# Patient Record
Sex: MALE | Race: WHITE | NOT HISPANIC OR LATINO | Employment: OTHER | URBAN - METROPOLITAN AREA
[De-identification: names, ages, dates, MRNs, and addresses within clinical notes are randomized per-mention and may not be internally consistent; named-entity substitution may affect disease eponyms.]

---

## 2017-01-27 ENCOUNTER — OFFICE VISIT (OUTPATIENT)
Dept: CARDIOLOGY | Facility: CLINIC | Age: 67
End: 2017-01-27

## 2017-01-27 VITALS
BODY MASS INDEX: 19.8 KG/M2 | WEIGHT: 138 LBS | SYSTOLIC BLOOD PRESSURE: 100 MMHG | DIASTOLIC BLOOD PRESSURE: 63 MMHG | HEART RATE: 64 BPM

## 2017-01-27 DIAGNOSIS — I50.22 CHRONIC SYSTOLIC CONGESTIVE HEART FAILURE (HCC): ICD-10-CM

## 2017-01-27 DIAGNOSIS — J43.8 OTHER EMPHYSEMA (HCC): ICD-10-CM

## 2017-01-27 DIAGNOSIS — I25.5 CARDIOMYOPATHY, ISCHEMIC: ICD-10-CM

## 2017-01-27 DIAGNOSIS — I25.10 CHRONIC CORONARY ARTERY DISEASE: Primary | ICD-10-CM

## 2017-01-27 DIAGNOSIS — N18.30 CKD (CHRONIC KIDNEY DISEASE), STAGE III (HCC): ICD-10-CM

## 2017-01-27 PROCEDURE — 93000 ELECTROCARDIOGRAM COMPLETE: CPT | Performed by: INTERNAL MEDICINE

## 2017-01-27 PROCEDURE — 99214 OFFICE O/P EST MOD 30 MIN: CPT | Performed by: INTERNAL MEDICINE

## 2017-03-04 ENCOUNTER — TRANSCRIBE ORDERS (OUTPATIENT)
Dept: ADMINISTRATIVE | Facility: HOSPITAL | Age: 67
End: 2017-03-04

## 2017-03-04 ENCOUNTER — APPOINTMENT (OUTPATIENT)
Dept: LAB | Facility: HOSPITAL | Age: 67
End: 2017-03-04
Attending: INTERNAL MEDICINE

## 2017-03-04 DIAGNOSIS — N18.30 CHRONIC KIDNEY DISEASE, STAGE III (MODERATE) (HCC): Primary | ICD-10-CM

## 2017-03-04 LAB
ANION GAP SERPL CALCULATED.3IONS-SCNC: 19.7 MMOL/L
BASOPHILS # BLD AUTO: 0.01 10*3/MM3 (ref 0–0.2)
BASOPHILS NFR BLD AUTO: 0.1 % (ref 0–1.5)
BILIRUB UR QL STRIP: NEGATIVE
BUN BLD-MCNC: 20 MG/DL (ref 8–23)
BUN/CREAT SERPL: 11 (ref 7–25)
CALCIUM SPEC-SCNC: 9.6 MG/DL (ref 8.6–10.5)
CHLORIDE SERPL-SCNC: 97 MMOL/L (ref 98–107)
CLARITY UR: CLEAR
CO2 SERPL-SCNC: 19.3 MMOL/L (ref 22–29)
COLOR UR: YELLOW
CREAT BLD-MCNC: 1.81 MG/DL (ref 0.76–1.27)
DEPRECATED RDW RBC AUTO: 43.8 FL (ref 37–54)
EOSINOPHIL # BLD AUTO: 0.06 10*3/MM3 (ref 0–0.7)
EOSINOPHIL NFR BLD AUTO: 0.8 % (ref 0.3–6.2)
ERYTHROCYTE [DISTWIDTH] IN BLOOD BY AUTOMATED COUNT: 13.4 % (ref 11.5–14.5)
GFR SERPL CREATININE-BSD FRML MDRD: 38 ML/MIN/1.73
GLUCOSE BLD-MCNC: 100 MG/DL (ref 65–99)
GLUCOSE UR STRIP-MCNC: NEGATIVE MG/DL
HCT VFR BLD AUTO: 38.4 % (ref 40.4–52.2)
HGB BLD-MCNC: 12.9 G/DL (ref 13.7–17.6)
HGB UR QL STRIP.AUTO: NEGATIVE
IMM GRANULOCYTES # BLD: 0.03 10*3/MM3 (ref 0–0.03)
IMM GRANULOCYTES NFR BLD: 0.4 % (ref 0–0.5)
KETONES UR QL STRIP: NEGATIVE
LEUKOCYTE ESTERASE UR QL STRIP.AUTO: NEGATIVE
LYMPHOCYTES # BLD AUTO: 1.68 10*3/MM3 (ref 0.9–4.8)
LYMPHOCYTES NFR BLD AUTO: 23 % (ref 19.6–45.3)
MCH RBC QN AUTO: 30.5 PG (ref 27–32.7)
MCHC RBC AUTO-ENTMCNC: 33.6 G/DL (ref 32.6–36.4)
MCV RBC AUTO: 90.8 FL (ref 79.8–96.2)
MONOCYTES # BLD AUTO: 0.65 10*3/MM3 (ref 0.2–1.2)
MONOCYTES NFR BLD AUTO: 8.9 % (ref 5–12)
NEUTROPHILS # BLD AUTO: 4.87 10*3/MM3 (ref 1.9–8.1)
NEUTROPHILS NFR BLD AUTO: 66.8 % (ref 42.7–76)
NITRITE UR QL STRIP: NEGATIVE
PH UR STRIP.AUTO: <=5 [PH] (ref 5–8)
PLATELET # BLD AUTO: 242 10*3/MM3 (ref 140–500)
PMV BLD AUTO: 9.4 FL (ref 6–12)
POTASSIUM BLD-SCNC: 4.7 MMOL/L (ref 3.5–5.2)
PROT UR QL STRIP: NEGATIVE
RBC # BLD AUTO: 4.23 10*6/MM3 (ref 4.6–6)
SODIUM BLD-SCNC: 136 MMOL/L (ref 136–145)
SP GR UR STRIP: 1.01 (ref 1–1.03)
UROBILINOGEN UR QL STRIP: NORMAL
WBC NRBC COR # BLD: 7.3 10*3/MM3 (ref 4.5–10.7)

## 2017-03-04 PROCEDURE — 36415 COLL VENOUS BLD VENIPUNCTURE: CPT | Performed by: INTERNAL MEDICINE

## 2017-03-04 PROCEDURE — 81003 URINALYSIS AUTO W/O SCOPE: CPT | Performed by: INTERNAL MEDICINE

## 2017-03-04 PROCEDURE — 80048 BASIC METABOLIC PNL TOTAL CA: CPT | Performed by: INTERNAL MEDICINE

## 2017-03-04 PROCEDURE — 85025 COMPLETE CBC W/AUTO DIFF WBC: CPT | Performed by: INTERNAL MEDICINE

## 2017-04-28 ENCOUNTER — OFFICE VISIT (OUTPATIENT)
Dept: CARDIOLOGY | Facility: CLINIC | Age: 67
End: 2017-04-28

## 2017-04-28 VITALS
WEIGHT: 122 LBS | BODY MASS INDEX: 17.51 KG/M2 | HEART RATE: 90 BPM | DIASTOLIC BLOOD PRESSURE: 74 MMHG | SYSTOLIC BLOOD PRESSURE: 100 MMHG

## 2017-04-28 DIAGNOSIS — N18.30 CKD (CHRONIC KIDNEY DISEASE), STAGE III (HCC): ICD-10-CM

## 2017-04-28 DIAGNOSIS — I50.22 CHRONIC SYSTOLIC CONGESTIVE HEART FAILURE (HCC): ICD-10-CM

## 2017-04-28 DIAGNOSIS — I25.5 CARDIOMYOPATHY, ISCHEMIC: ICD-10-CM

## 2017-04-28 DIAGNOSIS — J43.8 OTHER EMPHYSEMA (HCC): ICD-10-CM

## 2017-04-28 DIAGNOSIS — I25.10 CHRONIC CORONARY ARTERY DISEASE: ICD-10-CM

## 2017-04-28 DIAGNOSIS — I10 ESSENTIAL HYPERTENSION: Primary | ICD-10-CM

## 2017-04-28 PROCEDURE — 93000 ELECTROCARDIOGRAM COMPLETE: CPT | Performed by: INTERNAL MEDICINE

## 2017-04-28 PROCEDURE — 99213 OFFICE O/P EST LOW 20 MIN: CPT | Performed by: INTERNAL MEDICINE

## 2017-09-05 ENCOUNTER — OFFICE (OUTPATIENT)
Dept: URBAN - METROPOLITAN AREA CLINIC 75 | Facility: CLINIC | Age: 67
End: 2017-09-05
Payer: COMMERCIAL

## 2017-09-05 DIAGNOSIS — K90.0 CELIAC DISEASE: ICD-10-CM

## 2017-09-05 DIAGNOSIS — R63.4 ABNORMAL WEIGHT LOSS: ICD-10-CM

## 2017-09-05 DIAGNOSIS — E03.9 HYPOTHYROIDISM, UNSPECIFIED: ICD-10-CM

## 2017-09-05 PROCEDURE — 99203 OFFICE O/P NEW LOW 30 MIN: CPT | Performed by: INTERNAL MEDICINE

## 2017-09-15 ENCOUNTER — TELEPHONE (OUTPATIENT)
Dept: CARDIOLOGY | Facility: CLINIC | Age: 67
End: 2017-09-15

## 2017-09-15 NOTE — TELEPHONE ENCOUNTER
----- Message from Tanesha Gallegos sent at 9/7/2017 11:52 AM EDT -----  PLEASE CALL LEONARDO AT DR [facility] OFFICE [phone] PRESS [phone]  CHECKING ON THE STATUS OF A FAX REQUEST FOR PT TO GO OFF MEDS FOR A GASTRO PROCEDURE.  PT WAS A DR LORENZO PT.

## 2017-09-15 NOTE — TELEPHONE ENCOUNTER
----- Message from Tanesha Gallegos sent at 9/7/2017 11:52 AM EDT -----  PLEASE CALL DILIP AT DR [facility] OFFICE [phone] PRESS [phone]  CHECKING ON THE STATUS OF A FAX REQUEST FOR PT TO GO OFF MEDS FOR A GASTRO PROCEDURE.  PT WAS A DR LORENZO PT.      Called l/m for Dilip to r/c     S/w dilip she states this is taken care of

## 2017-10-06 ENCOUNTER — LAB (OUTPATIENT)
Dept: LAB | Facility: HOSPITAL | Age: 67
End: 2017-10-06
Attending: INTERNAL MEDICINE

## 2017-10-06 ENCOUNTER — OFFICE VISIT (OUTPATIENT)
Dept: CARDIOLOGY | Facility: CLINIC | Age: 67
End: 2017-10-06

## 2017-10-06 ENCOUNTER — TRANSCRIBE ORDERS (OUTPATIENT)
Dept: ADMINISTRATIVE | Facility: HOSPITAL | Age: 67
End: 2017-10-06

## 2017-10-06 VITALS
HEART RATE: 61 BPM | SYSTOLIC BLOOD PRESSURE: 102 MMHG | BODY MASS INDEX: 17.32 KG/M2 | DIASTOLIC BLOOD PRESSURE: 60 MMHG | WEIGHT: 121 LBS | HEIGHT: 70 IN

## 2017-10-06 DIAGNOSIS — N18.30 CHRONIC KIDNEY DISEASE, STAGE III (MODERATE) (HCC): ICD-10-CM

## 2017-10-06 DIAGNOSIS — I10 ESSENTIAL HYPERTENSION: ICD-10-CM

## 2017-10-06 DIAGNOSIS — I25.5 CARDIOMYOPATHY, ISCHEMIC: Primary | ICD-10-CM

## 2017-10-06 DIAGNOSIS — N18.30 CKD (CHRONIC KIDNEY DISEASE), STAGE III (HCC): ICD-10-CM

## 2017-10-06 DIAGNOSIS — N18.30 CHRONIC KIDNEY DISEASE, STAGE III (MODERATE) (HCC): Primary | ICD-10-CM

## 2017-10-06 DIAGNOSIS — I25.10 CHRONIC CORONARY ARTERY DISEASE: ICD-10-CM

## 2017-10-06 LAB
ANION GAP SERPL CALCULATED.3IONS-SCNC: 14.5 MMOL/L
BACTERIA UR QL AUTO: NORMAL /HPF
BILIRUB UR QL STRIP: NEGATIVE
BUN BLD-MCNC: 20 MG/DL (ref 8–23)
BUN/CREAT SERPL: 12.4 (ref 7–25)
CALCIUM SPEC-SCNC: 9.4 MG/DL (ref 8.6–10.5)
CHLORIDE SERPL-SCNC: 100 MMOL/L (ref 98–107)
CLARITY UR: CLEAR
CO2 SERPL-SCNC: 23.5 MMOL/L (ref 22–29)
COLOR UR: YELLOW
CREAT BLD-MCNC: 1.61 MG/DL (ref 0.76–1.27)
CREAT UR-MCNC: 84.2 MG/DL
DEPRECATED RDW RBC AUTO: 49.2 FL (ref 37–54)
ERYTHROCYTE [DISTWIDTH] IN BLOOD BY AUTOMATED COUNT: 13.8 % (ref 11.5–14.5)
GFR SERPL CREATININE-BSD FRML MDRD: 43 ML/MIN/1.73
GLUCOSE BLD-MCNC: 97 MG/DL (ref 65–99)
GLUCOSE UR STRIP-MCNC: NEGATIVE MG/DL
HCT VFR BLD AUTO: 41.6 % (ref 40.4–52.2)
HGB BLD-MCNC: 14 G/DL (ref 13.7–17.6)
HGB UR QL STRIP.AUTO: NEGATIVE
HYALINE CASTS UR QL AUTO: NORMAL /LPF
KETONES UR QL STRIP: NEGATIVE
LEUKOCYTE ESTERASE UR QL STRIP.AUTO: NEGATIVE
MCH RBC QN AUTO: 32.4 PG (ref 27–32.7)
MCHC RBC AUTO-ENTMCNC: 33.7 G/DL (ref 32.6–36.4)
MCV RBC AUTO: 96.3 FL (ref 79.8–96.2)
NITRITE UR QL STRIP: NEGATIVE
PH UR STRIP.AUTO: 5.5 [PH] (ref 5–8)
PLATELET # BLD AUTO: 268 10*3/MM3 (ref 140–500)
PMV BLD AUTO: 9.9 FL (ref 6–12)
POTASSIUM BLD-SCNC: 4.4 MMOL/L (ref 3.5–5.2)
PROT UR QL STRIP: NEGATIVE
PROT UR-MCNC: 4 MG/DL
RBC # BLD AUTO: 4.32 10*6/MM3 (ref 4.6–6)
RBC # UR: NORMAL /HPF
REF LAB TEST METHOD: NORMAL
SODIUM BLD-SCNC: 138 MMOL/L (ref 136–145)
SP GR UR STRIP: 1.01 (ref 1–1.03)
SQUAMOUS #/AREA URNS HPF: NORMAL /HPF
UROBILINOGEN UR QL STRIP: NORMAL
WBC NRBC COR # BLD: 7.27 10*3/MM3 (ref 4.5–10.7)
WBC UR QL AUTO: NORMAL /HPF

## 2017-10-06 PROCEDURE — 82570 ASSAY OF URINE CREATININE: CPT

## 2017-10-06 PROCEDURE — 84156 ASSAY OF PROTEIN URINE: CPT

## 2017-10-06 PROCEDURE — 36415 COLL VENOUS BLD VENIPUNCTURE: CPT

## 2017-10-06 PROCEDURE — 85027 COMPLETE CBC AUTOMATED: CPT

## 2017-10-06 PROCEDURE — 93000 ELECTROCARDIOGRAM COMPLETE: CPT | Performed by: INTERNAL MEDICINE

## 2017-10-06 PROCEDURE — 80048 BASIC METABOLIC PNL TOTAL CA: CPT

## 2017-10-06 PROCEDURE — 81001 URINALYSIS AUTO W/SCOPE: CPT

## 2017-10-06 PROCEDURE — 99214 OFFICE O/P EST MOD 30 MIN: CPT | Performed by: INTERNAL MEDICINE

## 2017-10-06 NOTE — PROGRESS NOTES
Mayo ROXANNE Arias  1950  Date of Office Visit: 10/06/2017  Encounter Provider: Raad Alcantara MD  Place of Service: [facility] CARDIOLOG[facility]      CHIEF COMPLAINT:  Coronary artery disease, multi-vessel  Ischemic cardiomyopathy  Essential hypertension  Hyperlipidemia    HISTORY OF PRESENT ILLNESS:  Ms. Alvarenga,    I had the pleasure of seeing your patient as a transfer of care. As you well know, he is a pleasant 67-year-old gentleman with a medical history of coronary artery disease with LAD and diagonal critical stenosis,  of the circumflex, and  of the RCA, ischemic cardiomyopathy with an ejection fraction last documented at 35%, nonviable anterior wall, dyspnea on exertion, chronic kidney disease, hypertension and hyperlipidemia. He has previously been evaluated with a coronary angiography as documented below. At that time he was referred to bypass and was evaluated by 2 cardiac surgeons. He had a viability scan, which I have reviewed, showing a large fixed defect in the [id number] anterior wall and apex with just a tiny amount of reversibility in the inferoapical wall. The defect was fixed and not felt to be viable. This was a thallium scan. Secondary to that, surgery was not recommended. He has not had intervention secondary to the complexity of the lesion but also his lack of angina.     He continues to complain of dyspnea on exertion when he walks greater than 100 feet.  This is moderate in intensity and improves with rest.  He has no orthopnea or PND.  No lower extremity edema.  He denies angina.          Review of Systems   Constitution: Negative for fever, weakness and malaise/fatigue.   HENT: Negative for nosebleeds and sore throat.    Eyes: Negative for blurred vision and double vision.   Cardiovascular: Negative for chest pain, claudication, palpitations and syncope.   Respiratory: Negative for cough, shortness of breath and snoring.    Endocrine: Negative  "for cold intolerance, heat intolerance and polydipsia.   Skin: Negative for itching, poor wound healing and rash.   Musculoskeletal: Negative for joint pain, joint swelling, muscle weakness and myalgias.   Gastrointestinal: Negative for abdominal pain, melena, nausea and vomiting.   Neurological: Positive for light-headedness. Negative for loss of balance, seizures and vertigo.   Psychiatric/Behavioral: Negative for altered mental status and depression.         Past Medical History:   Diagnosis Date   • CHF (congestive heart failure)    • Coronary artery disease    • Stroke        The following portions of the patient's history were reviewed and updated as appropriate: Social history , Family history and Surgical history     Current Outpatient Prescriptions on File Prior to Visit   Medication Sig Dispense Refill   • amiodarone (PACERONE) 200 MG tablet Take  by mouth daily.     • aspirin 81 MG tablet Take  by mouth daily.     • BREO ELLIPTA 100-25 MCG/INH aerosol powder       • carvedilol (COREG) 12.5 MG tablet Take 1 tablet by mouth 2 (two) times a day.     • clopidogrel (PLAVIX) 75 MG tablet Take 1 tablet by mouth daily.     • fluticasone (FLONASE) 50 MCG/ACT nasal spray      • furosemide (LASIX) 20 MG tablet Take 10 mg by mouth Daily.     • gabapentin (NEURONTIN) 400 MG capsule Take 1 capsule by mouth 2 (two) times a day.     • ramipril (ALTACE) 2.5 MG capsule Take 1 capsule by mouth daily.     • RaNITidine HCl (ACID REDUCER PO) Take  by mouth.     • simvastatin (ZOCOR) 5 MG tablet      • spironolactone (ALDACTONE) 25 MG tablet 1/2 tab qd       No current facility-administered medications on file prior to visit.        No Known Allergies    Vitals:    10/06/17 1326   BP: 102/60   Pulse: 61   Weight: 121 lb (54.9 kg)   Height: 70\" (177.8 cm)     Physical Exam   Constitutional: He is oriented to person, place, and time.   Cachectic     HENT:   Head: Normocephalic and atraumatic.   Eyes: Conjunctivae and EOM are " normal. No scleral icterus.   Neck: Normal range of motion. Neck supple. Normal carotid pulses, no hepatojugular reflux and no JVD present. Carotid bruit is not present. No tracheal deviation present. No thyromegaly present.   Cardiovascular: Normal rate and regular rhythm.  Exam reveals no friction rub.    No murmur heard.  Pulses:       Carotid pulses are 2+ on the right side, and 2+ on the left side.       Radial pulses are 2+ on the right side, and 2+ on the left side.        Femoral pulses are 2+ on the right side, and 2+ on the left side.       Dorsalis pedis pulses are 2+ on the right side, and 2+ on the left side.        Posterior tibial pulses are 2+ on the right side, and 2+ on the left side.   Pulmonary/Chest: Breath sounds normal. No respiratory distress. He has no decreased breath sounds. He has no wheezes. He has no rhonchi. He has no rales. He exhibits no tenderness.   Abdominal: Soft. Bowel sounds are normal. He exhibits no distension. There is no tenderness. There is no rebound.   Musculoskeletal: He exhibits no edema or deformity.   Neurological: He is alert and oriented to person, place, and time. He has normal strength. No sensory deficit.   Skin: No rash noted. No erythema.   Psychiatric: He has a normal mood and affect. His behavior is normal.     No components found for: CBC  No results found for: CMP  No components found for: LIPID  No results found for: BMP      ECG 12 Lead  Date/Time: 10/6/2017 2:06 PM  Performed by: MIRIAN OSBORNE  Authorized by: MIRIAN OSBORNE   Comparison: compared with previous ECG from 4/28/2017  Similar to previous ECG  Rhythm: sinus rhythm  Rate: normal  Conduction: non-specific intraventricular conduction delay  QRS axis: normal  Comments: Non-specific ST-T wave abnormalities diffuse leads           7/1/16  TTE  · Mild to moderate aortic valve regurgitation is present.  · Right ventricular cavity is mildly dilated.  · Left ventricular wall thickness is  consistent with mild-to-moderate concentric hypertrophy.  · Left ventricular wall segments contract abnormally. Refer to wall scoring diagram for more information.  · Mild mitral valve regurgitation is present  · Left ventricular function is moderately decreased. Estimated EF = 35%.  · Left ventricular diastolic dysfunction (grade I a) consistent with impaired relaxation    2/2015     FINDINGS:  1. The arterial blood pressure is 122/76. The left ventricular pressure is 118  with a left ventricular end-diastolic pressure of approximately 18 mmHg. No  significant gradient across the aortic valve.   2. The LV systolic function was severely reduced, ejection fraction  approximately 20%. The posterior wall is akinetic. The nga-io-vxjboj anterior  wall and the apex are hypokinetic. The inferior wall is akinetic as well.   3. No significant mitral regurgitation.   4. The left main coronary artery is a large-caliber vessel with no significant  stenosis. It bifurcates into the LAD and circumflex coronary arteries in the  standard fashion. The mid-LAD at the bifurcation of the diagonal branch has  severe 95% bifurcation stenosis involving the LAD and also the origin of the  diagonal branch. After this, there is a stent in the mid-LAD. The stent has  restenosis as well.   5. The left circumflex coronary artery is totally occluded after the marginal  branch proximally. The marginal branch itself proximally has mild disease.   6. The right coronary artery is totally occluded at the midvessel.     DISCUSSION/SUMMARY 67-year-old gentleman with a medical history of severe coronary artery disease with chronic total occlusion of the right, critical disease in the LAD, ischemic cardiomyopathy with a severely depressed ejection fraction, mild mitral valve regurgitation, essential hypertension, chronic kidney disease, who also follows with Dr. Jorgito Schroeder secondary to COPD. He presents to me as a transfer of care. He currently appears  to be euvolemic and has no angina. He does have dyspnea on exertion and New York Heart Association Class III symptoms.     1. Coronary artery disease: I have reviewed his angiography, and although I think that his LAD and diagonal are fixable with PCI, I do not think this is going to benefit him much. He appears to be cachetic and more towards endstage in his overall condition. He has no angina at this time and this is a nonviable territory based on his imaging. Continue aspirin and Plavix. He can stop his Plavix for endoscopy if necessary. I would recommend restarting after his endoscopy if he is going to undergo that. Continue Lasix at current dose. Continue carvedilol and ramipril. We can consider in the future Entresto; however, I would not make that move at this point in time.   2. Essential hypertension: Reasonably controlled. Continue current therapy.   3. Hyperlipidemia: Continue simvastatin at the low dose that he is on.    I will plan on seeing him back in 6 mo    Coronary Artery Disease  Assessment  • The patient has no angina    Plan  • Lifestyle modifications discussed include adhering to a heart healthy diet and avoidance of tobacco products    Subjective - Objective  • There has been a previous stent procedure using RYAN  • Current antiplatelet therapy includes aspirin 81 mg and clopidogrel 75 mg    Heart Failure  Assessment  • NYHA class III-B - There is significant limitation of physical activity. The patient is comfortable at rest, but minimal activity causes fatigue, palpitations or shortness of breath.  • ACE inhibitor prescribed  • Beta blocker prescribed  • Diuretics prescribed  • Angiotensin receptor blocker (ARB) not prescribed for medical reasons  • Calcium channel blockers not prescribed  • Left ventricular function is moderately reduced by qualitative assessment    Plan  • The heart failure care plan was discussed with the patient today including: continuing the current program,  up-titrating HF medications and lifestyle modifications    Subjective/Objective    • Physical exam findings negative for rales, peripheral edema and elevated JVP.        Lipid panel 10/2017    Total cholesterol 166  Triglycerides 107  HDL 45  .    I will recommend increase simvastatin dose 10 milligrams daily. Patient is aware.

## 2018-03-10 ENCOUNTER — LAB REQUISITION (OUTPATIENT)
Dept: LAB | Facility: HOSPITAL | Age: 68
End: 2018-03-10

## 2018-03-10 DIAGNOSIS — N18.30 CHRONIC KIDNEY DISEASE, STAGE III (MODERATE) (HCC): ICD-10-CM

## 2018-03-10 LAB
ANION GAP SERPL CALCULATED.3IONS-SCNC: 13.2 MMOL/L
BILIRUB UR QL STRIP: NEGATIVE
BUN BLD-MCNC: 26 MG/DL (ref 8–23)
BUN/CREAT SERPL: 15.3 (ref 7–25)
CALCIUM SPEC-SCNC: 9.3 MG/DL (ref 8.6–10.5)
CHLORIDE SERPL-SCNC: 97 MMOL/L (ref 98–107)
CLARITY UR: CLEAR
CO2 SERPL-SCNC: 23.8 MMOL/L (ref 22–29)
COLOR UR: YELLOW
CREAT BLD-MCNC: 1.7 MG/DL (ref 0.76–1.27)
CREAT UR-MCNC: 84.9 MG/DL
DEPRECATED RDW RBC AUTO: 50.4 FL (ref 37–54)
ERYTHROCYTE [DISTWIDTH] IN BLOOD BY AUTOMATED COUNT: 14.1 % (ref 11.5–14.5)
GFR SERPL CREATININE-BSD FRML MDRD: 40 ML/MIN/1.73
GLUCOSE BLD-MCNC: 91 MG/DL (ref 65–99)
GLUCOSE UR STRIP-MCNC: NEGATIVE MG/DL
HCT VFR BLD AUTO: 43.9 % (ref 40.4–52.2)
HGB BLD-MCNC: 14.8 G/DL (ref 13.7–17.6)
HGB UR QL STRIP.AUTO: NEGATIVE
KETONES UR QL STRIP: NEGATIVE
LEUKOCYTE ESTERASE UR QL STRIP.AUTO: NEGATIVE
MCH RBC QN AUTO: 33 PG (ref 27–32.7)
MCHC RBC AUTO-ENTMCNC: 33.7 G/DL (ref 32.6–36.4)
MCV RBC AUTO: 98 FL (ref 79.8–96.2)
NITRITE UR QL STRIP: NEGATIVE
PH UR STRIP.AUTO: 6 [PH] (ref 5–8)
PLATELET # BLD AUTO: 236 10*3/MM3 (ref 140–500)
PMV BLD AUTO: 9.7 FL (ref 6–12)
POTASSIUM BLD-SCNC: 4.7 MMOL/L (ref 3.5–5.2)
PROT UR QL STRIP: NEGATIVE
PROT UR-MCNC: 7 MG/DL
RBC # BLD AUTO: 4.48 10*6/MM3 (ref 4.6–6)
SODIUM BLD-SCNC: 134 MMOL/L (ref 136–145)
SP GR UR STRIP: 1.01 (ref 1–1.03)
UROBILINOGEN UR QL STRIP: NORMAL
WBC NRBC COR # BLD: 8.26 10*3/MM3 (ref 4.5–10.7)

## 2018-03-10 PROCEDURE — 36415 COLL VENOUS BLD VENIPUNCTURE: CPT | Performed by: INTERNAL MEDICINE

## 2018-03-10 PROCEDURE — 85027 COMPLETE CBC AUTOMATED: CPT | Performed by: INTERNAL MEDICINE

## 2018-03-10 PROCEDURE — 84156 ASSAY OF PROTEIN URINE: CPT | Performed by: INTERNAL MEDICINE

## 2018-03-10 PROCEDURE — 80048 BASIC METABOLIC PNL TOTAL CA: CPT | Performed by: INTERNAL MEDICINE

## 2018-03-10 PROCEDURE — 82570 ASSAY OF URINE CREATININE: CPT | Performed by: INTERNAL MEDICINE

## 2018-03-10 PROCEDURE — 81003 URINALYSIS AUTO W/O SCOPE: CPT | Performed by: INTERNAL MEDICINE

## 2018-04-20 ENCOUNTER — OFFICE VISIT (OUTPATIENT)
Dept: CARDIOLOGY | Facility: CLINIC | Age: 68
End: 2018-04-20

## 2018-04-20 VITALS
HEIGHT: 70 IN | BODY MASS INDEX: 17.18 KG/M2 | WEIGHT: 120 LBS | SYSTOLIC BLOOD PRESSURE: 102 MMHG | HEART RATE: 53 BPM | DIASTOLIC BLOOD PRESSURE: 68 MMHG

## 2018-04-20 DIAGNOSIS — I50.22 CHRONIC SYSTOLIC CONGESTIVE HEART FAILURE (HCC): Primary | ICD-10-CM

## 2018-04-20 DIAGNOSIS — I10 ESSENTIAL HYPERTENSION: ICD-10-CM

## 2018-04-20 DIAGNOSIS — I25.10 CHRONIC CORONARY ARTERY DISEASE: ICD-10-CM

## 2018-04-20 DIAGNOSIS — I25.5 CARDIOMYOPATHY, ISCHEMIC: ICD-10-CM

## 2018-04-20 PROCEDURE — 93000 ELECTROCARDIOGRAM COMPLETE: CPT | Performed by: INTERNAL MEDICINE

## 2018-04-20 PROCEDURE — 99214 OFFICE O/P EST MOD 30 MIN: CPT | Performed by: INTERNAL MEDICINE

## 2018-04-20 RX ORDER — RANITIDINE 150 MG/1
1 TABLET ORAL 2 TIMES DAILY
COMMUNITY
Start: 2018-04-06 | End: 2018-10-29 | Stop reason: SDUPTHER

## 2018-04-20 RX ORDER — SIMVASTATIN 10 MG
1 TABLET ORAL DAILY
COMMUNITY
Start: 2018-01-25

## 2018-04-20 RX ORDER — CARVEDILOL 6.25 MG/1
3.12 TABLET ORAL 2 TIMES DAILY
COMMUNITY
Start: 2018-04-06 | End: 2019-06-07 | Stop reason: DRUGHIGH

## 2018-04-20 RX ORDER — MULTIPLE VITAMINS W/ MINERALS TAB 9MG-400MCG
1 TAB ORAL DAILY
COMMUNITY

## 2018-04-20 RX ORDER — LEVOTHYROXINE SODIUM 88 UG/1
1 TABLET ORAL DAILY
COMMUNITY
Start: 2018-04-05 | End: 2019-12-19 | Stop reason: ALTCHOICE

## 2018-04-20 NOTE — PROGRESS NOTES
Mayo ROXANNE FieldsArias  1950  Date of Office Visit: 4/20/18  Encounter Provider: Raad Alcantara MD  Place of Service: [facility] CARDIOLOG[facility]      CHIEF COMPLAINT:  Coronary artery disease, multi-vessel  Ischemic cardiomyopathy  Essential hypertension  Hyperlipidemia    HISTORY OF PRESENT ILLNESS:  Ms. Alvarenga,  I had the pleasure of seeing your patient in follow-up. As you well know, he is a pleasant 67-year-old gentleman with a medical history of coronary artery disease with LAD and diagonal critical stenosis,  of the circumflex, and  of the RCA, ischemic cardiomyopathy with an ejection fraction last documented at 35%, nonviable anterior wall, dyspnea on exertion, chronic kidney disease, hypertension and hyperlipidemia. He has previously been evaluated with a coronary angiography as documented below. At that time he was referred to bypass and was evaluated by 2 cardiac surgeons. He had a viability scan, which I have reviewed, showing a large fixed defect in the [id number]-dx-pbqfrv anterior wall and apex with just a tiny amount of reversibility in the inferoapical wall. The defect was fixed and not felt to be viable. This was a thallium scan. Secondary to that, surgery was not recommended. He has not had intervention secondary to the complexity of the lesion but also his lack of angina.     Since our last visit, he has really not had much of a change.  He denies any chest pain.  He continues with hypoxia and shortness of breath.  His medications, including carvedilol and Lasix, have been decreased.  He still has mild bradycardia and borderline hypotension.        Review of Systems   Constitution: Negative for fever, weakness and malaise/fatigue.   HENT: Negative for nosebleeds and sore throat.    Eyes: Negative for blurred vision and double vision.   Cardiovascular: Negative for chest pain, claudication, palpitations and syncope.   Respiratory: Negative for cough, shortness of  "breath and snoring.    Endocrine: Negative for cold intolerance, heat intolerance and polydipsia.   Skin: Negative for itching, poor wound healing and rash.   Musculoskeletal: Negative for joint pain, joint swelling, muscle weakness and myalgias.   Gastrointestinal: Negative for abdominal pain, melena, nausea and vomiting.   Neurological: Positive for light-headedness. Negative for loss of balance, seizures and vertigo.   Psychiatric/Behavioral: Negative for altered mental status and depression.         Past Medical History:   Diagnosis Date   • CHF (congestive heart failure)    • Coronary artery disease    • Stroke        The following portions of the patient's history were reviewed and updated as appropriate: Social history , Family history and Surgical history     Current Outpatient Prescriptions on File Prior to Visit   Medication Sig Dispense Refill   • amiodarone (PACERONE) 200 MG tablet Take  by mouth daily.     • aspirin 81 MG tablet Take  by mouth daily.     • BREO ELLIPTA 100-25 MCG/INH aerosol powder       • carvedilol (COREG) 12.5 MG tablet Take 1 tablet by mouth 2 (two) times a day.     • clopidogrel (PLAVIX) 75 MG tablet Take 1 tablet by mouth daily.     • fluticasone (FLONASE) 50 MCG/ACT nasal spray      • furosemide (LASIX) 20 MG tablet Take 10 mg by mouth Daily.     • gabapentin (NEURONTIN) 400 MG capsule Take 1 capsule by mouth 2 (two) times a day.     • ramipril (ALTACE) 2.5 MG capsule Take 1 capsule by mouth daily.     • RaNITidine HCl (ACID REDUCER PO) Take  by mouth.     • simvastatin (ZOCOR) 5 MG tablet      • spironolactone (ALDACTONE) 25 MG tablet 1/2 tab qd       No current facility-administered medications on file prior to visit.        No Known Allergies    Vitals:    04/20/18 0955   Height: 177.8 cm (70\")     Physical Exam   Constitutional: He is oriented to person, place, and time.   Cachectic     HENT:   Head: Normocephalic and atraumatic.   Eyes: Conjunctivae and EOM are normal. No " scleral icterus.   Neck: Normal range of motion. Neck supple. Normal carotid pulses, no hepatojugular reflux and no JVD present. Carotid bruit is not present. No tracheal deviation present. No thyromegaly present.   Cardiovascular: Normal rate and regular rhythm.  Exam reveals no friction rub.    No murmur heard.  Pulses:       Carotid pulses are 2+ on the right side, and 2+ on the left side.       Radial pulses are 2+ on the right side, and 2+ on the left side.        Femoral pulses are 2+ on the right side, and 2+ on the left side.       Dorsalis pedis pulses are 2+ on the right side, and 2+ on the left side.        Posterior tibial pulses are 2+ on the right side, and 2+ on the left side.   Pulmonary/Chest: Breath sounds normal. No respiratory distress. He has no decreased breath sounds. He has no wheezes. He has no rhonchi. He has no rales. He exhibits no tenderness.   Abdominal: Soft. Bowel sounds are normal. He exhibits no distension. There is no tenderness. There is no rebound.   Musculoskeletal: He exhibits no edema or deformity.   Neurological: He is alert and oriented to person, place, and time. He has normal strength. No sensory deficit.   Skin: No rash noted. No erythema.   Psychiatric: He has a normal mood and affect. His behavior is normal.     No components found for: CBC  No results found for: CMP  No components found for: LIPID  No results found for: BMP      ECG 12 Lead  Date/Time: 4/20/2018 10:22 AM  Performed by: MIRIAN OSBORNE  Authorized by: MIRIAN OSBORNE   Comparison: compared with previous ECG from 10/6/2017  Similar to previous ECG  Rhythm: sinus rhythm  Rate: normal  Conduction: non-specific intraventricular conduction delay  QRS axis: normal  Clinical impression: non-specific ECG  Comments: Nonspecific T-wave abnormalities laterally           7/1/16  TTE  · Mild to moderate aortic valve regurgitation is present.  · Right ventricular cavity is mildly dilated.  · Left  ventricular wall thickness is consistent with mild-to-moderate concentric hypertrophy.  · Left ventricular wall segments contract abnormally. Refer to wall scoring diagram for more information.  · Mild mitral valve regurgitation is present  · Left ventricular function is moderately decreased. Estimated EF = 35%.  · Left ventricular diastolic dysfunction (grade I a) consistent with impaired relaxation    2/2015  FINDINGS:  1. The arterial blood pressure is 122/76. The left ventricular pressure is 118  with a left ventricular end-diastolic pressure of approximately 18 mmHg. No  significant gradient across the aortic valve.   2. The LV systolic function was severely reduced, ejection fraction  approximately 20%. The posterior wall is akinetic. The zqv-lk-[id number] anterior  wall and the apex are hypokinetic. The inferior wall is akinetic as well.   3. No significant mitral regurgitation.   4. The left main coronary artery is a large-caliber vessel with no significant  stenosis. It bifurcates into the LAD and circumflex coronary arteries in the  standard fashion. The mid-LAD at the bifurcation of the diagonal branch has  severe 95% bifurcation stenosis involving the LAD and also the origin of the  diagonal branch. After this, there is a stent in the mid-LAD. The stent has  restenosis as well.   5. The left circumflex coronary artery is totally occluded after the marginal  branch proximally. The marginal branch itself proximally has mild disease.   6. The right coronary artery is totally occluded at the midvessel.     DISCUSSION/SUMMARY 67-year-old gentleman with a medical history of severe coronary artery disease with chronic total occlusion of the right, critical disease in the LAD, ischemic cardiomyopathy with a severely depressed ejection fraction, mild mitral valve regurgitation, essential hypertension, chronic kidney disease, who also follows with Dr. Jorgito Schroeder secondary to COPD.  He currently appears to be  euvolemic and has no angina. He does have dyspnea on exertion and New York Heart Association Class III symptoms.     1. Coronary artery disease: I have reviewed his angiography, and although I think that his LAD and diagonal are fixable with PCI, I do not think this is going to benefit him much. He appears to be cachetic and more towards endstage in his overall condition. He has no angina at this time and this is a nonviable territory based on his imaging.    -Continue aspirin and Plavix. Continue Lasix at current dose.    -Continue carvedilol and ramipril.  I will decrease the carvedilol.  I think that his needs are decreasing.  2. Essential hypertension: Decrease carvedilol.  Pressure low normal.  3. Hyperlipidemia: Continue simvastatin   4.  Ventricular tachycardia, paroxysmal: No additional episodes.  Decrease amiodarone to 100 mg daily    I will plan on seeing him back in 6 mo    Coronary Artery Disease  Assessment  • The patient has no angina    Plan  • Lifestyle modifications discussed include adhering to a heart healthy diet and avoidance of tobacco products    Subjective - Objective  • There has been a previous stent procedure using RYAN  • Current antiplatelet therapy includes aspirin 81 mg and clopidogrel 75 mg    Heart Failure  Assessment  • NYHA class III-B - There is significant limitation of physical activity. The patient is comfortable at rest, but minimal activity causes fatigue, palpitations or shortness of breath.  • ACE inhibitor prescribed  • Beta blocker prescribed  • Diuretics prescribed  • Angiotensin receptor blocker (ARB) not prescribed for medical reasons  • Calcium channel blockers not prescribed  • Left ventricular function is moderately reduced by qualitative assessment    Plan  • The heart failure care plan was discussed with the patient today including: continuing the current program, up-titrating HF medications and lifestyle modifications    Subjective/Objective    • Physical exam  findings negative for rales, peripheral edema and elevated JVP.

## 2018-09-05 ENCOUNTER — TRANSCRIBE ORDERS (OUTPATIENT)
Dept: LAB | Facility: HOSPITAL | Age: 68
End: 2018-09-05

## 2018-09-05 ENCOUNTER — LAB (OUTPATIENT)
Dept: LAB | Facility: HOSPITAL | Age: 68
End: 2018-09-05
Attending: INTERNAL MEDICINE

## 2018-09-05 DIAGNOSIS — N18.30 CHRONIC KIDNEY DISEASE, STAGE III (MODERATE) (HCC): Primary | ICD-10-CM

## 2018-09-05 DIAGNOSIS — N18.30 CHRONIC KIDNEY DISEASE, STAGE III (MODERATE) (HCC): ICD-10-CM

## 2018-09-05 LAB
ANION GAP SERPL CALCULATED.3IONS-SCNC: 10.5 MMOL/L
BILIRUB UR QL STRIP: NEGATIVE
BUN BLD-MCNC: 17 MG/DL (ref 8–23)
BUN/CREAT SERPL: 12.1 (ref 7–25)
CALCIUM SPEC-SCNC: 9.2 MG/DL (ref 8.6–10.5)
CHLORIDE SERPL-SCNC: 103 MMOL/L (ref 98–107)
CLARITY UR: CLEAR
CO2 SERPL-SCNC: 23.5 MMOL/L (ref 22–29)
COLOR UR: YELLOW
CREAT BLD-MCNC: 1.41 MG/DL (ref 0.76–1.27)
DEPRECATED RDW RBC AUTO: 53.8 FL (ref 37–54)
ERYTHROCYTE [DISTWIDTH] IN BLOOD BY AUTOMATED COUNT: 14.8 % (ref 11.5–14.5)
GFR SERPL CREATININE-BSD FRML MDRD: 50 ML/MIN/1.73
GLUCOSE BLD-MCNC: 81 MG/DL (ref 65–99)
GLUCOSE UR STRIP-MCNC: NEGATIVE MG/DL
HCT VFR BLD AUTO: 39.1 % (ref 40.4–52.2)
HGB BLD-MCNC: 12.2 G/DL (ref 13.7–17.6)
HGB UR QL STRIP.AUTO: NEGATIVE
KETONES UR QL STRIP: NEGATIVE
LEUKOCYTE ESTERASE UR QL STRIP.AUTO: NEGATIVE
MCH RBC QN AUTO: 30.9 PG (ref 27–32.7)
MCHC RBC AUTO-ENTMCNC: 31.2 G/DL (ref 32.6–36.4)
MCV RBC AUTO: 99 FL (ref 79.8–96.2)
NITRITE UR QL STRIP: NEGATIVE
PH UR STRIP.AUTO: 6.5 [PH] (ref 5–8)
PLATELET # BLD AUTO: 249 10*3/MM3 (ref 140–500)
PMV BLD AUTO: 9.8 FL (ref 6–12)
POTASSIUM BLD-SCNC: 4.3 MMOL/L (ref 3.5–5.2)
PROT UR QL STRIP: NEGATIVE
RBC # BLD AUTO: 3.95 10*6/MM3 (ref 4.6–6)
SODIUM BLD-SCNC: 137 MMOL/L (ref 136–145)
SP GR UR STRIP: 1.02 (ref 1–1.03)
UROBILINOGEN UR QL STRIP: NORMAL
WBC NRBC COR # BLD: 6.38 10*3/MM3 (ref 4.5–10.7)

## 2018-09-05 PROCEDURE — 80048 BASIC METABOLIC PNL TOTAL CA: CPT

## 2018-09-05 PROCEDURE — 81003 URINALYSIS AUTO W/O SCOPE: CPT

## 2018-09-05 PROCEDURE — 85027 COMPLETE CBC AUTOMATED: CPT

## 2018-09-05 PROCEDURE — 36415 COLL VENOUS BLD VENIPUNCTURE: CPT

## 2018-10-29 ENCOUNTER — OFFICE VISIT (OUTPATIENT)
Dept: CARDIOLOGY | Facility: CLINIC | Age: 68
End: 2018-10-29

## 2018-10-29 VITALS
BODY MASS INDEX: 15.46 KG/M2 | HEART RATE: 67 BPM | SYSTOLIC BLOOD PRESSURE: 98 MMHG | DIASTOLIC BLOOD PRESSURE: 60 MMHG | WEIGHT: 108 LBS | HEIGHT: 70 IN

## 2018-10-29 DIAGNOSIS — I25.10 CHRONIC CORONARY ARTERY DISEASE: ICD-10-CM

## 2018-10-29 DIAGNOSIS — I25.5 CARDIOMYOPATHY, ISCHEMIC: ICD-10-CM

## 2018-10-29 DIAGNOSIS — J43.8 OTHER EMPHYSEMA (HCC): Primary | ICD-10-CM

## 2018-10-29 DIAGNOSIS — N18.30 CKD (CHRONIC KIDNEY DISEASE), STAGE III (HCC): ICD-10-CM

## 2018-10-29 PROCEDURE — 99214 OFFICE O/P EST MOD 30 MIN: CPT | Performed by: INTERNAL MEDICINE

## 2018-10-29 PROCEDURE — 93000 ELECTROCARDIOGRAM COMPLETE: CPT | Performed by: INTERNAL MEDICINE

## 2018-10-29 NOTE — PROGRESS NOTES
Mayo ROXANNE FieldsArias  1950  Date of Office Visit: 10/29/18  Encounter Provider: Raad Alcantara MD  Place of Service: [facility] CARDIOLOGY      CHIEF COMPLAINT:  Coronary artery disease, multi-vessel  Ischemic cardiomyopathy  Essential hypertension  Hyperlipidemia    HISTORY OF PRESENT ILLNESS:  Ms. Alvarenga,  I had the pleasure of seeing your patient in follow-up. As you well know, he is a pleasant 67-year-old gentleman with a medical history of coronary artery disease with LAD and diagonal critical stenosis,  of the circumflex, and  of the RCA, ischemic cardiomyopathy with an ejection fraction last documented at 35%, nonviable anterior wall, dyspnea on exertion, chronic kidney disease, hypertension and hyperlipidemia. He has previously been evaluated with a coronary angiography as documented below. At that time he was referred to bypass and was evaluated by 2 cardiac surgeons. He had a viability scan, which I have reviewed, showing a large fixed defect in the [id number] anterior wall and apex with just a tiny amount of reversibility in the inferoapical wall. The defect was fixed and not felt to be viable. This was a thallium scan. Secondary to that, surgery was not recommended. He has not had intervention secondary to the complexity of the lesion but also his lack of angina.     Since our last visit, he just continues to waste away.  He weighs 108 pounds currently and does not appear to be doing well at all.  He does deny angina, but I clearly do not think that this is his biggest problem at this point in time.  He appears to be failing to thrive.  He has no orthopnea or paroxysmal nocturnal dyspnea and denies lower extremity edema.  His carvedilol has been decreased secondary to hypotension.         Review of Systems   Constitution: Negative for fever, weakness and malaise/fatigue.   HENT: Negative for nosebleeds and sore throat.    Eyes: Negative for blurred vision  and double vision.   Cardiovascular: Negative for chest pain, claudication, palpitations and syncope.   Respiratory: Negative for cough, shortness of breath and snoring.    Endocrine: Negative for cold intolerance, heat intolerance and polydipsia.   Skin: Negative for itching, poor wound healing and rash.   Musculoskeletal: Negative for joint pain, joint swelling, muscle weakness and myalgias.   Gastrointestinal: Negative for abdominal pain, melena, nausea and vomiting.   Neurological: Positive for light-headedness. Negative for loss of balance, seizures and vertigo.   Psychiatric/Behavioral: Negative for altered mental status and depression.         Past Medical History:   Diagnosis Date   • CHF (congestive heart failure) (CMS/[facility])    • Coronary artery disease    • Stroke (CMS/[facility])        The following portions of the patient's history were reviewed and updated as appropriate: Social history , Family history and Surgical history     Current Outpatient Prescriptions on File Prior to Visit   Medication Sig Dispense Refill   • amiodarone (PACERONE) 200 MG tablet Take  by mouth daily.     • aspirin 81 MG tablet Take  by mouth daily.     • BREO ELLIPTA 100-25 MCG/INH aerosol powder       • carvedilol (COREG) 6.25 MG tablet Take 3.125 mg by mouth 2 (Two) Times a Day.     • clopidogrel (PLAVIX) 75 MG tablet Take 1 tablet by mouth daily.     • fluticasone (FLONASE) 50 MCG/ACT nasal spray      • furosemide (LASIX) 20 MG tablet Take 10 mg by mouth Daily.     • gabapentin (NEURONTIN) 400 MG capsule Take 1 capsule by mouth 2 (two) times a day.     • levothyroxine (SYNTHROID, LEVOTHROID) 88 MCG tablet Take 1 tablet by mouth Daily.     • Multiple Vitamins-Minerals (MULTIVITAMIN WITH MINERALS) tablet tablet Take 1 tablet by mouth Daily.     • ramipril (ALTACE) 2.5 MG capsule Take 1 capsule by mouth daily.     • RaNITidine HCl (ACID REDUCER PO) Take  by mouth.     • simvastatin (ZOCOR) 10 MG tablet Take 1 tablet by mouth Daily.    "  • spironolactone (ALDACTONE) 25 MG tablet 1/2 tab qd     • [DISCONTINUED] raNITIdine (ZANTAC) 150 MG tablet Take 1 tablet by mouth 2 (Two) Times a Day.     • [DISCONTINUED] simvastatin (ZOCOR) 5 MG tablet        No current facility-administered medications on file prior to visit.        No Known Allergies    Vitals:    10/29/18 1035   BP: 98/60   Pulse: 67   Weight: 49 kg (108 lb)   Height: 177.8 cm (70\")     Physical Exam   Constitutional: He is oriented to person, place, and time.   Cachectic     HENT:   Head: Normocephalic and atraumatic.   Eyes: Conjunctivae and EOM are normal. No scleral icterus.   Neck: Normal range of motion. Neck supple. Normal carotid pulses, no hepatojugular reflux and no JVD present. Carotid bruit is not present. No tracheal deviation present. No thyromegaly present.   Cardiovascular: Normal rate and regular rhythm.  Exam reveals no friction rub.    No murmur heard.  Pulses:       Carotid pulses are 2+ on the right side, and 2+ on the left side.       Radial pulses are 2+ on the right side, and 2+ on the left side.        Femoral pulses are 2+ on the right side, and 2+ on the left side.       Dorsalis pedis pulses are 2+ on the right side, and 2+ on the left side.        Posterior tibial pulses are 2+ on the right side, and 2+ on the left side.   Pulmonary/Chest: Breath sounds normal. No respiratory distress. He has no decreased breath sounds. He has no wheezes. He has no rhonchi. He has no rales. He exhibits no tenderness.   Abdominal: Soft. Bowel sounds are normal. He exhibits no distension. There is no tenderness. There is no rebound.   Musculoskeletal: He exhibits no edema or deformity.   Neurological: He is alert and oriented to person, place, and time. He has normal strength. No sensory deficit.   Skin: No rash noted. No erythema.   Psychiatric: He has a normal mood and affect. His behavior is normal.     No components found for: CBC  No results found for: CMP  No components found " for: LIPID  No results found for: BMP      ECG 12 Lead  Date/Time: 10/29/2018 11:26 AM  Performed by: MIRIAN OSBORNE  Authorized by: MIRIAN OSBORNE   Comparison: compared with previous ECG from 10/29/2018  Similar to previous ECG  Rhythm: sinus rhythm  Rate: normal  Conduction: non-specific intraventricular conduction delay  QRS axis: normal  Clinical impression: abnormal ECG  Comments: T-wave abnormalities diffuse leads.  Unchanged from prior.           7/1/16  TTE  · Mild to moderate aortic valve regurgitation is present.  · Right ventricular cavity is mildly dilated.  · Left ventricular wall thickness is consistent with mild-to-moderate concentric hypertrophy.  · Left ventricular wall segments contract abnormally. Refer to wall scoring diagram for more information.  · Mild mitral valve regurgitation is present  · Left ventricular function is moderately decreased. Estimated EF = 35%.  · Left ventricular diastolic dysfunction (grade I a) consistent with impaired relaxation    2/2015  FINDINGS:  1. The arterial blood pressure is 122/76. The left ventricular pressure is 118  with a left ventricular end-diastolic pressure of approximately 18 mmHg. No  significant gradient across the aortic valve.   2. The LV systolic function was severely reduced, ejection fraction  approximately 20%. The posterior wall is akinetic. The rbh-cr-ctgjud anterior  wall and the apex are hypokinetic. The inferior wall is akinetic as well.   3. No significant mitral regurgitation.   4. The left main coronary artery is a large-caliber vessel with no significant  stenosis. It bifurcates into the LAD and circumflex coronary arteries in the  standard fashion. The mid-LAD at the bifurcation of the diagonal branch has  severe 95% bifurcation stenosis involving the LAD and also the origin of the  diagonal branch. After this, there is a stent in the mid-LAD. The stent has  restenosis as well.   5. The left circumflex coronary artery is  totally occluded after the marginal  branch proximally. The marginal branch itself proximally has mild disease.   6. The right coronary artery is totally occluded at the midvessel.     DISCUSSION/SUMMARY 68-year-old gentleman with a medical history of severe coronary artery disease with chronic total occlusion of the right, critical disease in the LAD, ischemic cardiomyopathy with a severely depressed ejection fraction, mild mitral valve regurgitation, essential hypertension, chronic kidney disease, who also follows with Dr. Jorgito Schroeder secondary to COPD.  He currently appears to be euvolemic and has no angina.  He is failing to thrive.    1. Coronary artery disease: I have previously reviewed his angiography, and although I think that his LAD and diagonal are fixable with PCI, I do not think this is going to benefit him much. He appears to be cachetic and more towards endstage in his overall condition. He has no angina at this time and this is a nonviable territory based on his imaging.    -Continue aspirin and Plavix. Continue Lasix at current dose.    -Continue carvedilol and ramipril.    2. Essential hypertension: Continue carvedilol.  Pressure low normal.  A pressure decreases anymore the next step would be to discontinue the spironolactone.  3. Hyperlipidemia: Continue simvastatin   4.  Ventricular tachycardia, paroxysmal: No additional episodes.  Continue amiodarone at 100 mg daily    I will plan on seeing him back in 6 mo    Coronary Artery Disease  Assessment  • The patient has no angina    Plan  • Lifestyle modifications discussed include adhering to a heart healthy diet and avoidance of tobacco products    Subjective - Objective  • There has been a previous stent procedure using RYAN  • Current antiplatelet therapy includes aspirin 81 mg and clopidogrel 75 mg    Heart Failure  Assessment  • NYHA class III-B - There is significant limitation of physical activity. The patient is comfortable at rest, but  minimal activity causes fatigue, palpitations or shortness of breath.  • ACE inhibitor prescribed  • Beta blocker prescribed  • Diuretics prescribed  • Angiotensin receptor blocker (ARB) not prescribed for medical reasons  • Calcium channel blockers not prescribed  • Left ventricular function is moderately reduced by qualitative assessment    Plan  • The heart failure care plan was discussed with the patient today including: continuing the current program, up-titrating HF medications and lifestyle modifications    Subjective/Objective    • Physical exam findings negative for rales, peripheral edema and elevated JVP.

## 2019-03-09 ENCOUNTER — LAB (OUTPATIENT)
Dept: LAB | Facility: HOSPITAL | Age: 69
End: 2019-03-09

## 2019-03-09 ENCOUNTER — TRANSCRIBE ORDERS (OUTPATIENT)
Dept: LAB | Facility: HOSPITAL | Age: 69
End: 2019-03-09

## 2019-03-09 DIAGNOSIS — N18.30 CHRONIC KIDNEY DISEASE, STAGE III (MODERATE) (HCC): ICD-10-CM

## 2019-03-09 DIAGNOSIS — N18.30 CHRONIC KIDNEY DISEASE, STAGE III (MODERATE) (HCC): Primary | ICD-10-CM

## 2019-03-09 LAB
ANION GAP SERPL CALCULATED.3IONS-SCNC: 11.4 MMOL/L
BILIRUB UR QL STRIP: NEGATIVE
BUN BLD-MCNC: 19 MG/DL (ref 8–23)
BUN/CREAT SERPL: 12.8 (ref 7–25)
CALCIUM SPEC-SCNC: 9.7 MG/DL (ref 8.6–10.5)
CHLORIDE SERPL-SCNC: 99 MMOL/L (ref 98–107)
CLARITY UR: CLEAR
CO2 SERPL-SCNC: 22.6 MMOL/L (ref 22–29)
COLOR UR: YELLOW
CREAT BLD-MCNC: 1.49 MG/DL (ref 0.76–1.27)
DEPRECATED RDW RBC AUTO: 49.3 FL (ref 37–54)
ERYTHROCYTE [DISTWIDTH] IN BLOOD BY AUTOMATED COUNT: 13.2 % (ref 12.3–15.4)
GFR SERPL CREATININE-BSD FRML MDRD: 47 ML/MIN/1.73
GLUCOSE BLD-MCNC: 88 MG/DL (ref 65–99)
GLUCOSE UR STRIP-MCNC: NEGATIVE MG/DL
HCT VFR BLD AUTO: 41.4 % (ref 37.5–51)
HGB BLD-MCNC: 13 G/DL (ref 13–17.7)
HGB UR QL STRIP.AUTO: NEGATIVE
KETONES UR QL STRIP: NEGATIVE
LEUKOCYTE ESTERASE UR QL STRIP.AUTO: NEGATIVE
MCH RBC QN AUTO: 31.6 PG (ref 26.6–33)
MCHC RBC AUTO-ENTMCNC: 31.4 G/DL (ref 31.5–35.7)
MCV RBC AUTO: 100.5 FL (ref 79–97)
NITRITE UR QL STRIP: NEGATIVE
PH UR STRIP.AUTO: 6 [PH] (ref 5–8)
PLATELET # BLD AUTO: 264 10*3/MM3 (ref 140–450)
PMV BLD AUTO: 9.4 FL (ref 6–12)
POTASSIUM BLD-SCNC: 4.6 MMOL/L (ref 3.5–5.2)
PROT UR QL STRIP: NEGATIVE
RBC # BLD AUTO: 4.12 10*6/MM3 (ref 4.14–5.8)
SODIUM BLD-SCNC: 133 MMOL/L (ref 136–145)
SP GR UR STRIP: 1.01 (ref 1–1.03)
UROBILINOGEN UR QL STRIP: NORMAL
WBC NRBC COR # BLD: 7.46 10*3/MM3 (ref 3.4–10.8)

## 2019-03-09 PROCEDURE — 85027 COMPLETE CBC AUTOMATED: CPT

## 2019-03-09 PROCEDURE — 80048 BASIC METABOLIC PNL TOTAL CA: CPT

## 2019-03-09 PROCEDURE — 81003 URINALYSIS AUTO W/O SCOPE: CPT

## 2019-03-09 PROCEDURE — 36415 COLL VENOUS BLD VENIPUNCTURE: CPT

## 2019-06-07 ENCOUNTER — OFFICE VISIT (OUTPATIENT)
Dept: CARDIOLOGY | Facility: CLINIC | Age: 69
End: 2019-06-07

## 2019-06-07 VITALS
HEART RATE: 60 BPM | DIASTOLIC BLOOD PRESSURE: 60 MMHG | BODY MASS INDEX: 15.6 KG/M2 | SYSTOLIC BLOOD PRESSURE: 112 MMHG | WEIGHT: 109 LBS | HEIGHT: 70 IN

## 2019-06-07 DIAGNOSIS — J43.8 OTHER EMPHYSEMA (HCC): ICD-10-CM

## 2019-06-07 DIAGNOSIS — I25.10 CHRONIC CORONARY ARTERY DISEASE: ICD-10-CM

## 2019-06-07 DIAGNOSIS — I10 ESSENTIAL HYPERTENSION: Primary | ICD-10-CM

## 2019-06-07 DIAGNOSIS — I25.5 CARDIOMYOPATHY, ISCHEMIC: ICD-10-CM

## 2019-06-07 PROCEDURE — 99214 OFFICE O/P EST MOD 30 MIN: CPT | Performed by: INTERNAL MEDICINE

## 2019-06-07 PROCEDURE — 93000 ELECTROCARDIOGRAM COMPLETE: CPT | Performed by: INTERNAL MEDICINE

## 2019-06-07 RX ORDER — RANITIDINE 150 MG/1
150 TABLET ORAL 2 TIMES DAILY
COMMUNITY
End: 2022-11-28 | Stop reason: ALTCHOICE

## 2019-06-07 RX ORDER — CARVEDILOL 3.12 MG/1
3.12 TABLET ORAL 2 TIMES DAILY WITH MEALS
COMMUNITY

## 2019-06-07 RX ORDER — TRAMADOL HYDROCHLORIDE 50 MG/1
50 TABLET ORAL EVERY 6 HOURS PRN
COMMUNITY

## 2019-06-07 RX ORDER — AMIODARONE HYDROCHLORIDE 200 MG/1
100 TABLET ORAL DAILY
COMMUNITY

## 2019-06-07 NOTE — PROGRESS NOTES
Mayo OLIVEIRA Arais  1950  Date of Office Visit: 6/7/19  Encounter Provider: Raad Alcantara MD  Place of Service: [facility] CARDIOLOGY      CHIEF COMPLAINT:  Coronary artery disease, multi-vessel  Ischemic cardiomyopathy  Essential hypertension  Hyperlipidemia    HISTORY OF PRESENT ILLNESS:  Ms. Alvarenga,  I had the pleasure of seeing your patient in follow-up. As you well know, he is a pleasant 69-year-old gentleman with a medical history of coronary artery disease with LAD and diagonal critical stenosis,  of the circumflex, and  of the RCA, ischemic cardiomyopathy with an ejection fraction last documented at 35%, nonviable anterior wall, dyspnea on exertion, chronic kidney disease, hypertension and hyperlipidemia. He has previously been evaluated with a coronary angiography as documented below. At that time he was referred to bypass and was evaluated by 2 cardiac surgeons. He had a viability scan, which I have reviewed, showing a large fixed defect in the ill-yk-[id number] anterior wall and apex with just a tiny amount of reversibility in the inferoapical wall. The defect was fixed and not felt to be viable. This was a thallium scan. Secondary to that, surgery was not recommended. He has not had intervention secondary to the complexity of the lesion but also his lack of angina.     Since her last visit has been doing well.  He denies any chest pain or lower extremity edema.  He still has chronic moderate in intensity dyspnea on exertion.      Review of Systems   Constitution: Negative for fever, weakness and malaise/fatigue.   HENT: Negative for nosebleeds and sore throat.    Eyes: Negative for blurred vision and double vision.   Cardiovascular: Negative for chest pain, claudication, palpitations and syncope.   Respiratory: Negative for cough, shortness of breath and snoring.    Endocrine: Negative for cold intolerance, heat intolerance and polydipsia.   Skin: Negative for  itching, poor wound healing and rash.   Musculoskeletal: Negative for joint pain, joint swelling, muscle weakness and myalgias.   Gastrointestinal: Negative for abdominal pain, melena, nausea and vomiting.   Neurological: Positive for light-headedness. Negative for loss of balance, seizures and vertigo.   Psychiatric/Behavioral: Negative for altered mental status and depression.         Past Medical History:   Diagnosis Date   • CHF (congestive heart failure) (CMS/[facility])    • Coronary artery disease    • Stroke (CMS/[facility])        The following portions of the patient's history were reviewed and updated as appropriate: Social history , Family history and Surgical history     Current Outpatient Medications on File Prior to Visit   Medication Sig Dispense Refill   • amiodarone (PACERONE) 100 MG half tablet Take 100 mg by mouth Daily.     • aspirin 81 MG tablet Take 81 mg by mouth Daily.     • BREO ELLIPTA 100-25 MCG/INH aerosol powder       • carvedilol (COREG) 3.125 MG tablet Take 3.125 mg by mouth 2 (Two) Times a Day With Meals.     • clopidogrel (PLAVIX) 75 MG tablet Take 1 tablet by mouth daily.     • fluticasone (FLONASE) 50 MCG/ACT nasal spray      • furosemide (LASIX) 20 MG tablet Take 10 mg by mouth Daily.     • gabapentin (NEURONTIN) 400 MG capsule Take 1 capsule by mouth 2 (two) times a day.     • levothyroxine (SYNTHROID, LEVOTHROID) 88 MCG tablet Take 1 tablet by mouth Daily.     • Multiple Vitamins-Minerals (MULTIVITAMIN WITH MINERALS) tablet tablet Take 1 tablet by mouth Daily.     • ramipril (ALTACE) 2.5 MG capsule Take 1 capsule by mouth daily.     • raNITIdine (ZANTAC) 150 MG tablet Take 150 mg by mouth 2 (Two) Times a Day.     • simvastatin (ZOCOR) 10 MG tablet Take 1 tablet by mouth Daily.     • spironolactone (ALDACTONE) 25 MG tablet 1/2 tab qd     • traMADol (ULTRAM) 50 MG tablet Take 50 mg by mouth Every 6 (Six) Hours As Needed for Moderate Pain .     • [DISCONTINUED] amiodarone (PACERONE) 200 MG  "tablet Take  by mouth daily.     • [DISCONTINUED] carvedilol (COREG) 6.25 MG tablet Take 3.125 mg by mouth 2 (Two) Times a Day.     • [DISCONTINUED] RaNITidine HCl (ACID REDUCER PO) Take  by mouth.       No current facility-administered medications on file prior to visit.        No Known Allergies    Vitals:    06/07/19 1246   BP: 112/60   Pulse: 60   Weight: 49.4 kg (109 lb)   Height: 177.8 cm (70\")     Physical Exam   Constitutional: He is oriented to person, place, and time.   Cachectic     HENT:   Head: Normocephalic and atraumatic.   Eyes: Conjunctivae and EOM are normal. No scleral icterus.   Neck: Normal range of motion. Neck supple. Normal carotid pulses, no hepatojugular reflux and no JVD present. Carotid bruit is not present. No tracheal deviation present. No thyromegaly present.   Cardiovascular: Normal rate and regular rhythm. Exam reveals no friction rub.   No murmur heard.  Pulses:       Carotid pulses are 2+ on the right side, and 2+ on the left side.       Radial pulses are 2+ on the right side, and 2+ on the left side.        Femoral pulses are 2+ on the right side, and 2+ on the left side.       Dorsalis pedis pulses are 2+ on the right side, and 2+ on the left side.        Posterior tibial pulses are 2+ on the right side, and 2+ on the left side.   Pulmonary/Chest: Breath sounds normal. No respiratory distress. He has no decreased breath sounds. He has no wheezes. He has no rhonchi. He has no rales. He exhibits no tenderness.   Abdominal: Soft. Bowel sounds are normal. He exhibits no distension. There is no tenderness. There is no rebound.   Musculoskeletal: He exhibits no edema or deformity.   Neurological: He is alert and oriented to person, place, and time. He has normal strength. No sensory deficit.   Skin: No rash noted. No erythema.   Psychiatric: He has a normal mood and affect. His behavior is normal.     No components found for: CBC  No results found for: CMP  No components found for: " LIPID  No results found for: BMP      ECG 12 Lead  Date/Time: 6/7/2019 1:12 PM  Performed by: Raad Alcantara MD  Authorized by: Raad Alcantara MD   Comparison: compared with previous ECG from 10/29/2018  Rhythm: sinus rhythm  Rate: normal  Conduction: non-specific intraventricular conduction delay  QRS axis: normal    Clinical impression: abnormal EKG  Comments: Repoarization abnorm           7/1/16  TTE  · Mild to moderate aortic valve regurgitation is present.  · Right ventricular cavity is mildly dilated.  · Left ventricular wall thickness is consistent with mild-to-moderate concentric hypertrophy.  · Left ventricular wall segments contract abnormally. Refer to wall scoring diagram for more information.  · Mild mitral valve regurgitation is present  · Left ventricular function is moderately decreased. Estimated EF = 35%.  · Left ventricular diastolic dysfunction (grade I a) consistent with impaired relaxation    2/2015  FINDINGS:  1. The arterial blood pressure is 122/76. The left ventricular pressure is 118  with a left ventricular end-diastolic pressure of approximately 18 mmHg. No  significant gradient across the aortic valve.   2. The LV systolic function was severely reduced, ejection fraction  approximately 20%. The posterior wall is akinetic. The pli-cs-uaigsh anterior  wall and the apex are hypokinetic. The inferior wall is akinetic as well.   3. No significant mitral regurgitation.   4. The left main coronary artery is a large-caliber vessel with no significant  stenosis. It bifurcates into the LAD and circumflex coronary arteries in the  standard fashion. The mid-LAD at the bifurcation of the diagonal branch has  severe 95% bifurcation stenosis involving the LAD and also the origin of the  diagonal branch. After this, there is a stent in the mid-LAD. The stent has  restenosis as well.   5. The left circumflex coronary artery is totally occluded after the marginal  branch proximally. The  marginal branch itself proximally has mild disease.   6. The right coronary artery is totally occluded at the midvessel.     DISCUSSION/SUMMARY 69-year-old gentleman with a medical history of severe coronary artery disease with chronic total occlusion of the right, critical disease in the LAD, ischemic cardiomyopathy with a severely depressed ejection fraction, mild mitral valve regurgitation, essential hypertension, chronic kidney disease, who also follows with Dr. Jorgito Schroeder secondary to COPD.  He currently appears to be euvolemic and has no angina.  He is cachectic, however his weight has not significantly changed since our last visit    1. Coronary artery disease: I have previously reviewed his angiography, and although I think that his LAD and diagonal are fixable with PCI, I do not think this is going to benefit him much. He appears to be cachetic and more towards endstage in his overall condition. He has no angina at this time and this is a nonviable territory based on his imaging.    -Continue aspirin and Plavix. Continue Lasix at current dose.    -Continue carvedilol and ramipril.    2. Essential hypertension: Continue carvedilol.    3. Hyperlipidemia: Continue simvastatin   4.  Ventricular tachycardia, paroxysmal: No additional episodes.  Continue amiodarone at 100 mg daily    I will plan on seeing him back in 6 mo    Coronary Artery Disease  Assessment  • The patient has no angina    Plan  • Lifestyle modifications discussed include adhering to a heart healthy diet and avoidance of tobacco products    Subjective - Objective  • There has been a previous stent procedure using RYAN  • Current antiplatelet therapy includes aspirin 81 mg and clopidogrel 75 mg    Heart Failure  Assessment  • NYHA class III-B - There is significant limitation of physical activity. The patient is comfortable at rest, but minimal activity causes fatigue, palpitations or shortness of breath.  • ACE inhibitor prescribed  • Beta  blocker prescribed  • Diuretics prescribed  • Angiotensin receptor blocker (ARB) not prescribed for medical reasons  • Calcium channel blockers not prescribed  • Left ventricular function is moderately reduced by qualitative assessment    Plan  • The patient has received heart failure education on the following topics: dietary sodium restriction, medication instructions and minimizing or avoiding NSAID use  • The heart failure care plan was discussed with the patient today including: continuing the current program    Subjective/Objective    • Physical exam findings negative for rales, peripheral edema and elevated JVP.

## 2019-10-01 ENCOUNTER — TRANSCRIBE ORDERS (OUTPATIENT)
Dept: ADMINISTRATIVE | Facility: HOSPITAL | Age: 69
End: 2019-10-01

## 2019-10-01 ENCOUNTER — LAB (OUTPATIENT)
Dept: LAB | Facility: HOSPITAL | Age: 69
End: 2019-10-01

## 2019-10-01 DIAGNOSIS — N18.30 CHRONIC KIDNEY DISEASE, STAGE III (MODERATE) (HCC): Primary | ICD-10-CM

## 2019-10-01 LAB
ANION GAP SERPL CALCULATED.3IONS-SCNC: 13.8 MMOL/L (ref 5–15)
BILIRUB UR QL STRIP: NEGATIVE
BUN BLD-MCNC: 18 MG/DL (ref 8–23)
BUN/CREAT SERPL: 13.7 (ref 7–25)
CALCIUM SPEC-SCNC: 9.3 MG/DL (ref 8.6–10.5)
CHLORIDE SERPL-SCNC: 97 MMOL/L (ref 98–107)
CLARITY UR: CLEAR
CO2 SERPL-SCNC: 23.2 MMOL/L (ref 22–29)
COLOR UR: YELLOW
CREAT BLD-MCNC: 1.31 MG/DL (ref 0.76–1.27)
CREAT UR-MCNC: 34.3 MG/DL
DEPRECATED RDW RBC AUTO: 45.4 FL (ref 37–54)
ERYTHROCYTE [DISTWIDTH] IN BLOOD BY AUTOMATED COUNT: 12.9 % (ref 12.3–15.4)
GFR SERPL CREATININE-BSD FRML MDRD: 54 ML/MIN/1.73
GLUCOSE BLD-MCNC: 85 MG/DL (ref 65–99)
GLUCOSE UR STRIP-MCNC: NEGATIVE MG/DL
HCT VFR BLD AUTO: 37.9 % (ref 37.5–51)
HGB BLD-MCNC: 12.8 G/DL (ref 13–17.7)
HGB UR QL STRIP.AUTO: NEGATIVE
KETONES UR QL STRIP: NEGATIVE
LEUKOCYTE ESTERASE UR QL STRIP.AUTO: NEGATIVE
MCH RBC QN AUTO: 32.7 PG (ref 26.6–33)
MCHC RBC AUTO-ENTMCNC: 33.8 G/DL (ref 31.5–35.7)
MCV RBC AUTO: 96.9 FL (ref 79–97)
NITRITE UR QL STRIP: NEGATIVE
PH UR STRIP.AUTO: <=5 [PH] (ref 5–8)
PLATELET # BLD AUTO: 257 10*3/MM3 (ref 140–450)
PMV BLD AUTO: 9 FL (ref 6–12)
POTASSIUM BLD-SCNC: 4.4 MMOL/L (ref 3.5–5.2)
PROT UR QL STRIP: NEGATIVE
PROT UR-MCNC: 6 MG/DL
RBC # BLD AUTO: 3.91 10*6/MM3 (ref 4.14–5.8)
SODIUM BLD-SCNC: 134 MMOL/L (ref 136–145)
SP GR UR STRIP: 1.01 (ref 1–1.03)
UROBILINOGEN UR QL STRIP: NORMAL
WBC NRBC COR # BLD: 7.05 10*3/MM3 (ref 3.4–10.8)

## 2019-10-01 PROCEDURE — 82570 ASSAY OF URINE CREATININE: CPT

## 2019-10-01 PROCEDURE — 36415 COLL VENOUS BLD VENIPUNCTURE: CPT

## 2019-10-01 PROCEDURE — 80048 BASIC METABOLIC PNL TOTAL CA: CPT

## 2019-10-01 PROCEDURE — 81003 URINALYSIS AUTO W/O SCOPE: CPT

## 2019-10-01 PROCEDURE — 84156 ASSAY OF PROTEIN URINE: CPT

## 2019-10-01 PROCEDURE — 85027 COMPLETE CBC AUTOMATED: CPT

## 2019-12-19 ENCOUNTER — OFFICE VISIT (OUTPATIENT)
Dept: CARDIOLOGY | Facility: CLINIC | Age: 69
End: 2019-12-19

## 2019-12-19 VITALS
BODY MASS INDEX: 14.89 KG/M2 | HEIGHT: 70 IN | HEART RATE: 68 BPM | WEIGHT: 104 LBS | SYSTOLIC BLOOD PRESSURE: 110 MMHG | DIASTOLIC BLOOD PRESSURE: 66 MMHG

## 2019-12-19 DIAGNOSIS — I25.5 CARDIOMYOPATHY, ISCHEMIC: ICD-10-CM

## 2019-12-19 DIAGNOSIS — I10 ESSENTIAL HYPERTENSION: Primary | ICD-10-CM

## 2019-12-19 DIAGNOSIS — R64 CACHEXIA (HCC): ICD-10-CM

## 2019-12-19 DIAGNOSIS — I50.22 CHRONIC SYSTOLIC CONGESTIVE HEART FAILURE (HCC): ICD-10-CM

## 2019-12-19 DIAGNOSIS — I25.10 CHRONIC CORONARY ARTERY DISEASE: ICD-10-CM

## 2019-12-19 DIAGNOSIS — J43.8 OTHER EMPHYSEMA (HCC): ICD-10-CM

## 2019-12-19 PROCEDURE — 93000 ELECTROCARDIOGRAM COMPLETE: CPT | Performed by: INTERNAL MEDICINE

## 2019-12-19 PROCEDURE — 99214 OFFICE O/P EST MOD 30 MIN: CPT | Performed by: INTERNAL MEDICINE

## 2019-12-19 RX ORDER — ALBUTEROL SULFATE 90 UG/1
AEROSOL, METERED RESPIRATORY (INHALATION)
COMMUNITY
Start: 2019-03-20

## 2019-12-19 RX ORDER — LEVOTHYROXINE SODIUM 88 UG/1
75 TABLET ORAL DAILY
COMMUNITY

## 2019-12-19 NOTE — PROGRESS NOTES
Mayo ROXANNE Arias  1950  Date of Office Visit: 12/19/19  Encounter Provider: Raad Alcantara MD  Place of Service: [facility]      CHIEF COMPLAINT:  Coronary artery disease, multi-vessel  Ischemic cardiomyopathy  Essential hypertension  Hyperlipidemia    HISTORY OF PRESENT ILLNESS:  Ms. Alvarenga,  I had the pleasure of seeing your patient in follow-up. As you well know, he is a pleasant 69-year-old gentleman with a medical history of coronary artery disease with LAD and diagonal critical stenosis,  of the circumflex, and  of the RCA, ischemic cardiomyopathy with an ejection fraction last documented at 35%, nonviable anterior wall, dyspnea on exertion, chronic kidney disease, hypertension and hyperlipidemia. He has previously been evaluated with a coronary angiography as documented below. At that time he was referred to bypass and was evaluated by 2 cardiac surgeons. He had a viability scan, which I have reviewed, showing a large fixed defect in the byo-jv-uumjho anterior wall and apex with just a tiny amount of reversibility in the inferoapical wall. The defect was fixed and not felt to be viable. This was a thallium scan. Secondary to that, surgery was not recommended. He has not had intervention secondary to the complexity of the lesion but also his lack of angina.     Since our last visit I really do not see much of a change in his condition.  He is severely cachectic and has a BMI of 14.  He has severe lung disease and overall, just does not look great.  He denies any chest pain, orthopnea, PND or edema but he clearly has a combination of cardiac and pulmonary cachexia.          Review of Systems   Constitution: Negative for fever and malaise/fatigue.   HENT: Negative for nosebleeds and sore throat.    Eyes: Negative for blurred vision and double vision.   Cardiovascular: Negative for chest pain, claudication, palpitations and syncope.   Respiratory:  Negative for cough, shortness of breath and snoring.    Endocrine: Negative for cold intolerance, heat intolerance and polydipsia.   Skin: Negative for itching, poor wound healing and rash.   Musculoskeletal: Negative for joint pain, joint swelling, muscle weakness and myalgias.   Gastrointestinal: Negative for abdominal pain, melena, nausea and vomiting.   Neurological: Positive for light-headedness. Negative for loss of balance, seizures, vertigo and weakness.   Psychiatric/Behavioral: Negative for altered mental status and depression.         Past Medical History:   Diagnosis Date   • CHF (congestive heart failure) (CMS/[facility])    • Coronary artery disease    • Stroke (CMS/[facility])        The following portions of the patient's history were reviewed and updated as appropriate: Social history , Family history and Surgical history     Current Outpatient Medications on File Prior to Visit   Medication Sig Dispense Refill   • albuterol sulfate HFA (VENTOLIN HFA) 108 (90 Base) MCG/ACT inhaler Ventolin HFA 90 mcg/actuation aerosol inhaler   Inhale 2 puffs every 4-6 hrs PRN SOA or wheezing.     • amiodarone (PACERONE) 100 MG half tablet Take 100 mg by mouth Daily.     • aspirin 81 MG tablet Take 81 mg by mouth Daily.     • carvedilol (COREG) 3.125 MG tablet Take 3.125 mg by mouth 2 (Two) Times a Day With Meals.     • clopidogrel (PLAVIX) 75 MG tablet Take 1 tablet by mouth daily.     • fluticasone (FLONASE) 50 MCG/ACT nasal spray      • Fluticasone-Umeclidin-Vilant (TRELEGY ELLIPTA) 100-62.5-25 MCG/INH aerosol powder  Trelegy Ellipta 100 mcg-62.5 mcg-25 mcg powder for inhalation   Inhale 1 puff every day by inhalation route.     • furosemide (LASIX) 20 MG tablet Take 10 mg by mouth Daily.     • gabapentin (NEURONTIN) 400 MG capsule Take 1 capsule by mouth 2 (two) times a day.     • levothyroxine (SYNTHROID, LEVOTHROID) 88 MCG tablet Take 88 mcg by mouth Daily.     • Multiple Vitamins-Minerals (MULTIVITAMIN WITH MINERALS)  "tablet tablet Take 1 tablet by mouth Daily.     • ramipril (ALTACE) 2.5 MG capsule Take 1 capsule by mouth daily.     • raNITIdine (ZANTAC) 150 MG tablet Take 150 mg by mouth 2 (Two) Times a Day.     • simvastatin (ZOCOR) 10 MG tablet Take 1 tablet by mouth Daily.     • spironolactone (ALDACTONE) 25 MG tablet 1/2 tab qd     • traMADol (ULTRAM) 50 MG tablet Take 50 mg by mouth Every 6 (Six) Hours As Needed for Moderate Pain .     • [DISCONTINUED] BREO ELLIPTA 100-25 MCG/INH aerosol powder       • [DISCONTINUED] levothyroxine (SYNTHROID, LEVOTHROID) 88 MCG tablet Take 1 tablet by mouth Daily.       No current facility-administered medications on file prior to visit.        Allergies   Allergen Reactions   • Naproxen Hives       Vitals:    12/19/19 1521   BP: 110/66   Pulse: 68   Weight: 47.2 kg (104 lb)   Height: 177.8 cm (70\")     Physical Exam   Constitutional: He is oriented to person, place, and time.   Cachectic     HENT:   Head: Normocephalic and atraumatic.   Eyes: Conjunctivae and EOM are normal. No scleral icterus.   Neck: Normal range of motion. Neck supple. Normal carotid pulses, no hepatojugular reflux and no JVD present. Carotid bruit is not present. No tracheal deviation present. No thyromegaly present.   Cardiovascular: Normal rate and regular rhythm. Exam reveals no friction rub.   No murmur heard.  Pulses:       Carotid pulses are 2+ on the right side, and 2+ on the left side.       Radial pulses are 2+ on the right side, and 2+ on the left side.        Femoral pulses are 2+ on the right side, and 2+ on the left side.       Dorsalis pedis pulses are 2+ on the right side, and 2+ on the left side.        Posterior tibial pulses are 2+ on the right side, and 2+ on the left side.   Pulmonary/Chest: Breath sounds normal. No respiratory distress. He has no decreased breath sounds. He has no wheezes. He has no rhonchi. He has no rales. He exhibits no tenderness.   Abdominal: Soft. Bowel sounds are normal. He " exhibits no distension. There is no tenderness. There is no rebound.   Musculoskeletal: He exhibits no edema or deformity.   Neurological: He is alert and oriented to person, place, and time. He has normal strength. No sensory deficit.   Skin: No rash noted. No erythema.   Psychiatric: He has a normal mood and affect. His behavior is normal.     No results found for: CBC  No results found for: CMP  No components found for: LIPID  No results found for: BMP      ECG 12 Lead  Date/Time: 12/19/2019 4:03 PM  Performed by: Raad Alcantara MD  Authorized by: Raad Alcantara MD   Comparison: compared with previous ECG from 6/7/2019  Similar to previous ECG  Rhythm: sinus rhythm  Rate: normal  QRS axis: normal  Other findings: left ventricular hypertrophy with strain    Clinical impression: abnormal EKG               7/1/16  TTE  · Mild to moderate aortic valve regurgitation is present.  · Right ventricular cavity is mildly dilated.  · Left ventricular wall thickness is consistent with mild-to-moderate concentric hypertrophy.  · Left ventricular wall segments contract abnormally. Refer to wall scoring diagram for more information.  · Mild mitral valve regurgitation is present  · Left ventricular function is moderately decreased. Estimated EF = 35%.  · Left ventricular diastolic dysfunction (grade I a) consistent with impaired relaxation    2/2015  FINDINGS:  1. The arterial blood pressure is 122/76. The left ventricular pressure is 118  with a left ventricular end-diastolic pressure of approximately 18 mmHg. No  significant gradient across the aortic valve.   2. The LV systolic function was severely reduced, ejection fraction  approximately 20%. The posterior wall is akinetic. The faf-ei-dkniwe anterior  wall and the apex are hypokinetic. The inferior wall is akinetic as well.   3. No significant mitral regurgitation.   4. The left main coronary artery is a large-caliber vessel with no significant  stenosis. It  bifurcates into the LAD and circumflex coronary arteries in the  standard fashion. The mid-LAD at the bifurcation of the diagonal branch has  severe 95% bifurcation stenosis involving the LAD and also the origin of the  diagonal branch. After this, there is a stent in the mid-LAD. The stent has  restenosis as well.   5. The left circumflex coronary artery is totally occluded after the marginal  branch proximally. The marginal branch itself proximally has mild disease.   6. The right coronary artery is totally occluded at the midvessel.     DISCUSSION/SUMMARY 69-year-old gentleman with a medical history of severe coronary artery disease with chronic total occlusion of the right, critical disease in the LAD, ischemic cardiomyopathy with a severely depressed ejection fraction, mild mitral valve regurgitation, essential hypertension, chronic kidney disease, who also follows with Dr. Jorgito Schroeder secondary to COPD.  He currently appears to be euvolemic and has no angina.  He is severely cachectic, however this does not appear to have recently changed.  He has not had any additional issues with VT as of late.    1. Coronary artery disease: I have previously reviewed his angiography, and although I think that his LAD and diagonal are fixable with PCI, I do not think this is going to benefit him much. He appears to be cachetic and more towards endstage in his overall condition. He has no angina at this time and this is a nonviable territory based on his imaging.    -Continue aspirin and Plavix. Continue Lasix at current dose.    -Continue carvedilol and ramipril.    2. Essential hypertension: Continue carvedilol.  This is at goal.  3. Hyperlipidemia: Continue simvastatin   4.  Ventricular tachycardia, paroxysmal: No additional episodes.  Continue amiodarone at 100 mg daily    I would strongly discourage doing procedures on this gentleman including colonoscopy.  I think that he should be somebody who should only have any  emergent surgery if necessary and if this is the case I would go into this aware that he will be high risk for anything that is done.  I would not recommend screening procedure    I will plan on seeing him back in 6 mo     independent

## 2020-05-05 ENCOUNTER — LAB (OUTPATIENT)
Dept: LAB | Facility: HOSPITAL | Age: 70
End: 2020-05-05

## 2020-05-05 ENCOUNTER — TRANSCRIBE ORDERS (OUTPATIENT)
Dept: ADMINISTRATIVE | Facility: HOSPITAL | Age: 70
End: 2020-05-05

## 2020-05-05 DIAGNOSIS — N18.30 CHRONIC KIDNEY DISEASE, STAGE III (MODERATE) (HCC): Primary | ICD-10-CM

## 2020-05-05 DIAGNOSIS — N18.30 CHRONIC KIDNEY DISEASE, STAGE III (MODERATE) (HCC): ICD-10-CM

## 2020-05-05 LAB
ANION GAP SERPL CALCULATED.3IONS-SCNC: 10.3 MMOL/L (ref 5–15)
BILIRUB UR QL STRIP: NEGATIVE
BUN BLD-MCNC: 18 MG/DL (ref 8–23)
BUN/CREAT SERPL: 13.5 (ref 7–25)
CALCIUM SPEC-SCNC: 9.5 MG/DL (ref 8.6–10.5)
CHLORIDE SERPL-SCNC: 96 MMOL/L (ref 98–107)
CLARITY UR: CLEAR
CO2 SERPL-SCNC: 23.7 MMOL/L (ref 22–29)
COLOR UR: YELLOW
CREAT BLD-MCNC: 1.33 MG/DL (ref 0.76–1.27)
CREAT UR-MCNC: 38.9 MG/DL
DEPRECATED RDW RBC AUTO: 42.6 FL (ref 37–54)
ERYTHROCYTE [DISTWIDTH] IN BLOOD BY AUTOMATED COUNT: 12.3 % (ref 12.3–15.4)
GFR SERPL CREATININE-BSD FRML MDRD: 53 ML/MIN/1.73
GLUCOSE BLD-MCNC: 97 MG/DL (ref 65–99)
GLUCOSE UR STRIP-MCNC: NEGATIVE MG/DL
HCT VFR BLD AUTO: 37.6 % (ref 37.5–51)
HGB BLD-MCNC: 13 G/DL (ref 13–17.7)
HGB UR QL STRIP.AUTO: NEGATIVE
KETONES UR QL STRIP: NEGATIVE
LEUKOCYTE ESTERASE UR QL STRIP.AUTO: NEGATIVE
MCH RBC QN AUTO: 32.6 PG (ref 26.6–33)
MCHC RBC AUTO-ENTMCNC: 34.6 G/DL (ref 31.5–35.7)
MCV RBC AUTO: 94.2 FL (ref 79–97)
NITRITE UR QL STRIP: NEGATIVE
PH UR STRIP.AUTO: <=5 [PH] (ref 5–8)
PLATELET # BLD AUTO: 249 10*3/MM3 (ref 140–450)
PMV BLD AUTO: 9.7 FL (ref 6–12)
POTASSIUM BLD-SCNC: 4.8 MMOL/L (ref 3.5–5.2)
PROT UR QL STRIP: NEGATIVE
PROT UR-MCNC: <4 MG/DL
RBC # BLD AUTO: 3.99 10*6/MM3 (ref 4.14–5.8)
SODIUM BLD-SCNC: 130 MMOL/L (ref 136–145)
SP GR UR STRIP: 1.01 (ref 1–1.03)
UROBILINOGEN UR QL STRIP: NORMAL
WBC NRBC COR # BLD: 6.22 10*3/MM3 (ref 3.4–10.8)

## 2020-05-05 PROCEDURE — 81003 URINALYSIS AUTO W/O SCOPE: CPT

## 2020-05-05 PROCEDURE — 84156 ASSAY OF PROTEIN URINE: CPT

## 2020-05-05 PROCEDURE — 85027 COMPLETE CBC AUTOMATED: CPT

## 2020-05-05 PROCEDURE — 36415 COLL VENOUS BLD VENIPUNCTURE: CPT

## 2020-05-05 PROCEDURE — 80048 BASIC METABOLIC PNL TOTAL CA: CPT

## 2020-05-05 PROCEDURE — 82570 ASSAY OF URINE CREATININE: CPT

## 2020-06-24 NOTE — PROGRESS NOTES
Date of Office Visit: 2020  Encounter Provider: ADENIKE Barajas  Place of Service: [facility]  Patient Name: Mayo Arias  :1950    Chief Complaint   Patient presents with   • Coronary Artery Disease   :     HPI: Mayo Arias is a 70 y.o. male, new to me, who presents today for follow-up.  Old records have been obtained and reviewed by me.  He is a patient of Dr. Alcantara's with a past cardiac history significant for coronary artery disease.  He has critical stenosis of the LAD and diagonal,  of the circumflex, and  of the RCA.  He also has an ischemic cardiomyopathy with an EF of 35%.  In  he was evaluated for CABG.  He had a viability scan, which I have reviewed, showing a large fixed defect in the llf-tv-[id number] anterior wall and apex with just a tiny amount of reversibility in the inferoapical wall. The defect was fixed and not felt to be viable. This was a thallium scan. Secondary to that, surgery was not recommended. He has not had intervention secondary to the complexity of the lesion but also his lack of angina.  He also has a history of ventricular tachycardia for which he has been maintained on amiodarone.   He was last seen in the office by Dr. Alcantara on 2019.  Per Dr. Alcantara's report he was severely cachectic and just overall did not look well.  Dr. Alcantara noted that he would strongly discourage doing procedures on him and felt he would be someone who should only have emergent procedures.  No changes were made in his medical regimen, and he was advised to follow-up in 6 months.   Overall he is stable.  He says he feels about the same.  He does not feel any better but he also does not feel any worse.  He has stable shortness of breath that is unchanged.  He denies any PND or orthopnea.  He denies any chest pain, palpitations, edema, dizziness, or syncope.  He denies any bleeding difficulties or melena.  His activity is  severely restricted.     Past Medical History:   Diagnosis Date   • CHF (congestive heart failure) (CMS/HCC)    • Coronary artery disease    • Stroke (CMS/HCC)        Past Surgical History:   Procedure Laterality Date   • CARDIAC CATHETERIZATION  2015    [facility] stents at [facility]   • CARDIAC CATHETERIZATION  06/18/1999    x2 stents at [place]   • CORONARY ANGIOPLASTY WITH STENT PLACEMENT         Social History     Socioeconomic History   • Marital status:      Spouse name: Not on file   • Number of children: Not on file   • Years of education: Not on file   • Highest education level: Not on file   Tobacco Use   • Smoking status: Former Smoker     Packs/day: 1.00     Years: 15.00     Pack years: 15.00     Last attempt to quit: 2014     Years since quittin.4   • Smokeless tobacco: Never Used   Substance and Sexual Activity   • Alcohol use: No       Family History   Problem Relation Age of Onset   • Hypertension Other    • No Known Problems Mother    • No Known Problems Father    • Coronary artery disease Neg Hx        Review of Systems   Constitution: Positive for malaise/fatigue. Negative for chills and fever.   Cardiovascular: Positive for dyspnea on exertion. Negative for chest pain, leg swelling, near-syncope, orthopnea, palpitations, paroxysmal nocturnal dyspnea and syncope.   Respiratory: Negative for cough and shortness of breath.    Musculoskeletal: Negative for joint pain, joint swelling and myalgias.   Gastrointestinal: Negative for abdominal pain, diarrhea, melena, nausea and vomiting.   Genitourinary: Negative for frequency and hematuria.   Neurological: Negative for light-headedness, numbness, paresthesias and seizures.   Allergic/Immunologic: Negative.    All other systems reviewed and are negative.      Allergies   Allergen Reactions   • Naproxen Hives         Current Outpatient Medications:   •  albuterol sulfate HFA (VENTOLIN HFA) 108 (90 Base) MCG/ACT inhaler, Ventolin HFA 90  "mcg/actuation aerosol inhaler  Inhale 2 puffs every 4-6 hrs PRN SOA or wheezing., Disp: , Rfl:   •  amiodarone (PACERONE) 100 MG half tablet, Take 100 mg by mouth Daily., Disp: , Rfl:   •  aspirin 81 MG tablet, Take 81 mg by mouth Daily., Disp: , Rfl:   •  carvedilol (COREG) 3.125 MG tablet, Take 3.125 mg by mouth 2 (Two) Times a Day With Meals., Disp: , Rfl:   •  clopidogrel (PLAVIX) 75 MG tablet, Take 1 tablet by mouth daily., Disp: , Rfl:   •  fluticasone (FLONASE) 50 MCG/ACT nasal spray, , Disp: , Rfl:   •  Fluticasone-Umeclidin-Vilant (TRELEGY ELLIPTA) 100-62.5-25 MCG/INH aerosol powder , Trelegy Ellipta 100 mcg-62.5 mcg-25 mcg powder for inhalation  Inhale 1 puff every day by inhalation route., Disp: , Rfl:   •  furosemide (LASIX) 20 MG tablet, Take 10 mg by mouth Daily., Disp: , Rfl:   •  gabapentin (NEURONTIN) 400 MG capsule, Take 1 capsule by mouth 2 (two) times a day., Disp: , Rfl:   •  levothyroxine (SYNTHROID, LEVOTHROID) 88 MCG tablet, Take 75 mcg by mouth Daily., Disp: , Rfl:   •  Multiple Vitamins-Minerals (MULTIVITAMIN WITH MINERALS) tablet tablet, Take 1 tablet by mouth Daily., Disp: , Rfl:   •  ramipril (ALTACE) 2.5 MG capsule, Take 1 capsule by mouth daily., Disp: , Rfl:   •  raNITIdine (ZANTAC) 150 MG tablet, Take 150 mg by mouth 2 (Two) Times a Day., Disp: , Rfl:   •  simvastatin (ZOCOR) 10 MG tablet, Take 1 tablet by mouth Daily., Disp: , Rfl:   •  spironolactone (ALDACTONE) 25 MG tablet, 1/2 tab qd, Disp: , Rfl:   •  traMADol (ULTRAM) 50 MG tablet, Take 50 mg by mouth Every 6 (Six) Hours As Needed for Moderate Pain ., Disp: , Rfl:       Objective:     Vitals:    06/25/20 1332 06/25/20 1333   BP: 90/60 92/62   BP Location: Right arm Left arm   Patient Position: Sitting Sitting   Pulse: 70    Weight: 45.3 kg (99 lb 12.8 oz)    Height: 177.8 cm (70\")      Body mass index is 14.32 kg/m².    PHYSICAL EXAM:    Physical Exam   Constitutional: He is oriented to person, place, and time. He appears " well-developed and well-nourished. No distress.   HENT:   Head: Normocephalic and atraumatic.   Eyes: Pupils are equal, round, and reactive to light.   Neck: No JVD present. No thyromegaly present.   Cardiovascular: Normal rate, regular rhythm, normal heart sounds and intact distal pulses.   No murmur heard.  Pulmonary/Chest: Effort normal and breath sounds normal. No respiratory distress.   Abdominal: Soft. Bowel sounds are normal. He exhibits no distension. There is no splenomegaly or hepatomegaly. There is no tenderness.   Musculoskeletal: Normal range of motion. He exhibits no edema.   Neurological: He is alert and oriented to person, place, and time.   Skin: Skin is warm and dry. He is not diaphoretic. No erythema.   Psychiatric: He has a normal mood and affect. His behavior is normal. Judgment normal.         ECG 12 Lead  Date/Time: 6/25/2020 1:36 PM  Performed by: Smita Tello APRN  Authorized by: Smita Tello APRN   Comparison: compared with previous ECG from 12/19/2019  Similar to previous ECG  Rhythm: sinus rhythm  Rate: normal  BPM: 70  Conduction: non-specific intraventricular conduction delay  T inversion: II, III and aVF    Clinical impression: abnormal EKG  Comments: Indication: CAD              Assessment:       Diagnosis Plan   1. Chronic coronary artery disease  ECG 12 Lead   2. Cardiomyopathy, ischemic     3. Cachexia (CMS/HCC)     4. Ventricular tachycardia (paroxysmal) (CMS/[facility])       Orders Placed This Encounter   Procedures   • ECG 12 Lead     This order was created via procedure documentation          Plan:       1.  Coronary artery disease.  He denies any symptoms of angina.  His EKG is stable and unchanged.  He is on good medical therapy with aspirin, Plavix, and simvastatin.      2.  Ischemic cardiomyopathy.  Ejection fraction of 35%.  He is on goal-directed medical therapy with carvedilol, ramipril, and spironolactone.  He denies any symptoms of acute heart failure.  He  appears euvolemic.      3.  Ventricular tachycardia.  He has a distant history of nonsustained ventricular tachycardia for which he is on low-dose amiodarone.  I do not see where he has had recent lab work or chest x-ray.  Per the patient's report, he gets all of this completed at his primary care doctor.  He has an appointment with his PCP next week.  I will have my medical assistant call and verify that he is having these labs performed.      Overall I think he is stable and doing well from a cardiac standpoint.  He denies any symptoms of angina or heart failure.  He is very frail and thin.  His blood pressure is rather low.  Fortunately, he is not having any symptoms.  He states that his PCP has already cut back on the dose of his ramipril.  If his blood pressure drops much lower, this medication will likely need to be discontinued.  He will follow-up with Dr. Alcantara in 6 months or sooner if needed.      As always, it has been a pleasure to participate in your patient's care.      Sincerely,         ADENIKE Young

## 2020-06-25 ENCOUNTER — TELEPHONE (OUTPATIENT)
Dept: CARDIOLOGY | Facility: CLINIC | Age: 70
End: 2020-06-25

## 2020-06-25 ENCOUNTER — OFFICE VISIT (OUTPATIENT)
Dept: CARDIOLOGY | Facility: CLINIC | Age: 70
End: 2020-06-25

## 2020-06-25 VITALS
SYSTOLIC BLOOD PRESSURE: 92 MMHG | WEIGHT: 99.8 LBS | HEART RATE: 70 BPM | HEIGHT: 70 IN | BODY MASS INDEX: 14.29 KG/M2 | DIASTOLIC BLOOD PRESSURE: 62 MMHG

## 2020-06-25 DIAGNOSIS — R64 CACHEXIA (HCC): ICD-10-CM

## 2020-06-25 DIAGNOSIS — I47.29 VENTRICULAR TACHYCARDIA (PAROXYSMAL) (HCC): ICD-10-CM

## 2020-06-25 DIAGNOSIS — I47.29 VENTRICULAR TACHYCARDIA (PAROXYSMAL) (HCC): Primary | ICD-10-CM

## 2020-06-25 DIAGNOSIS — Z79.899 ON AMIODARONE THERAPY: ICD-10-CM

## 2020-06-25 DIAGNOSIS — I25.5 CARDIOMYOPATHY, ISCHEMIC: ICD-10-CM

## 2020-06-25 DIAGNOSIS — I25.10 CHRONIC CORONARY ARTERY DISEASE: Primary | ICD-10-CM

## 2020-06-25 PROCEDURE — 99214 OFFICE O/P EST MOD 30 MIN: CPT | Performed by: NURSE PRACTITIONER

## 2020-06-25 PROCEDURE — 93000 ELECTROCARDIOGRAM COMPLETE: CPT | Performed by: NURSE PRACTITIONER

## 2020-06-25 NOTE — TELEPHONE ENCOUNTER
This patient is on chronic amiodarone therapy.  We need to ensure that somebody is checking labs and a chest x-ray every 6 months.  He states that his PCP does.  The patient says he has an appointment with him next week.  Can you call his PCP and verify?  He should be having LFTs, free T4, and a chest x-ray every 6 months.

## 2020-06-25 NOTE — TELEPHONE ENCOUNTER
Called Dr. Garcia's office, patient does have appt next week 7/2.  He has not had these labs or chest xray, but said that they would be happy to take care of both if we fax them an order.    Smita, if agreeable please place orders.    Kenia, please fax order to Dr. Reyes Garcia's office at [phone].    Thanks!

## 2020-12-07 ENCOUNTER — TRANSCRIBE ORDERS (OUTPATIENT)
Dept: ADMINISTRATIVE | Facility: HOSPITAL | Age: 70
End: 2020-12-07

## 2020-12-07 ENCOUNTER — LAB (OUTPATIENT)
Dept: LAB | Facility: HOSPITAL | Age: 70
End: 2020-12-07

## 2020-12-07 DIAGNOSIS — I13.10 MALIGNANT HYPERTENSIVE HEART AND CHRONIC KIDNEY DISEASE STAGE III (HCC): ICD-10-CM

## 2020-12-07 DIAGNOSIS — N18.30 MALIGNANT HYPERTENSIVE HEART AND CHRONIC KIDNEY DISEASE STAGE III (HCC): Primary | ICD-10-CM

## 2020-12-07 DIAGNOSIS — I13.10 MALIGNANT HYPERTENSIVE HEART AND CHRONIC KIDNEY DISEASE STAGE III (HCC): Primary | ICD-10-CM

## 2020-12-07 DIAGNOSIS — N18.30 MALIGNANT HYPERTENSIVE HEART AND CHRONIC KIDNEY DISEASE STAGE III (HCC): ICD-10-CM

## 2020-12-07 LAB
ANION GAP SERPL CALCULATED.3IONS-SCNC: 10.4 MMOL/L (ref 5–15)
BILIRUB UR QL STRIP: NEGATIVE
BUN SERPL-MCNC: 22 MG/DL (ref 8–23)
BUN/CREAT SERPL: 18.2 (ref 7–25)
CALCIUM SPEC-SCNC: 9.6 MG/DL (ref 8.6–10.5)
CHLORIDE SERPL-SCNC: 97 MMOL/L (ref 98–107)
CLARITY UR: CLEAR
CO2 SERPL-SCNC: 24.6 MMOL/L (ref 22–29)
COLOR UR: YELLOW
CREAT SERPL-MCNC: 1.21 MG/DL (ref 0.76–1.27)
DEPRECATED RDW RBC AUTO: 43.7 FL (ref 37–54)
ERYTHROCYTE [DISTWIDTH] IN BLOOD BY AUTOMATED COUNT: 12.1 % (ref 12.3–15.4)
GFR SERPL CREATININE-BSD FRML MDRD: 59 ML/MIN/1.73
GLUCOSE SERPL-MCNC: 92 MG/DL (ref 65–99)
GLUCOSE UR STRIP-MCNC: NEGATIVE MG/DL
HCT VFR BLD AUTO: 36.8 % (ref 37.5–51)
HGB BLD-MCNC: 12.8 G/DL (ref 13–17.7)
HGB UR QL STRIP.AUTO: NEGATIVE
KETONES UR QL STRIP: NEGATIVE
LEUKOCYTE ESTERASE UR QL STRIP.AUTO: NEGATIVE
MCH RBC QN AUTO: 34 PG (ref 26.6–33)
MCHC RBC AUTO-ENTMCNC: 34.8 G/DL (ref 31.5–35.7)
MCV RBC AUTO: 97.9 FL (ref 79–97)
NITRITE UR QL STRIP: NEGATIVE
PH UR STRIP.AUTO: 6.5 [PH] (ref 5–8)
PLATELET # BLD AUTO: 254 10*3/MM3 (ref 140–450)
PMV BLD AUTO: 9.3 FL (ref 6–12)
POTASSIUM SERPL-SCNC: 4.7 MMOL/L (ref 3.5–5.2)
PROT UR QL STRIP: NEGATIVE
RBC # BLD AUTO: 3.76 10*6/MM3 (ref 4.14–5.8)
SODIUM SERPL-SCNC: 132 MMOL/L (ref 136–145)
SP GR UR STRIP: 1.02 (ref 1–1.03)
UROBILINOGEN UR QL STRIP: NORMAL
WBC # BLD AUTO: 6.84 10*3/MM3 (ref 3.4–10.8)

## 2020-12-07 PROCEDURE — 85027 COMPLETE CBC AUTOMATED: CPT

## 2020-12-07 PROCEDURE — 81003 URINALYSIS AUTO W/O SCOPE: CPT

## 2020-12-07 PROCEDURE — 36415 COLL VENOUS BLD VENIPUNCTURE: CPT

## 2020-12-07 PROCEDURE — 80048 BASIC METABOLIC PNL TOTAL CA: CPT

## 2021-01-04 ENCOUNTER — OFFICE VISIT (OUTPATIENT)
Dept: CARDIOLOGY | Facility: CLINIC | Age: 71
End: 2021-01-04

## 2021-01-04 VITALS
BODY MASS INDEX: 14.03 KG/M2 | DIASTOLIC BLOOD PRESSURE: 60 MMHG | HEIGHT: 70 IN | WEIGHT: 98 LBS | HEART RATE: 65 BPM | SYSTOLIC BLOOD PRESSURE: 102 MMHG

## 2021-01-04 DIAGNOSIS — I10 ESSENTIAL HYPERTENSION: ICD-10-CM

## 2021-01-04 DIAGNOSIS — I25.5 CARDIOMYOPATHY, ISCHEMIC: Primary | ICD-10-CM

## 2021-01-04 DIAGNOSIS — R64 CACHEXIA (HCC): ICD-10-CM

## 2021-01-04 DIAGNOSIS — I50.22 CHRONIC SYSTOLIC CONGESTIVE HEART FAILURE (HCC): ICD-10-CM

## 2021-01-04 PROCEDURE — 99214 OFFICE O/P EST MOD 30 MIN: CPT | Performed by: INTERNAL MEDICINE

## 2021-01-04 PROCEDURE — 93000 ELECTROCARDIOGRAM COMPLETE: CPT | Performed by: INTERNAL MEDICINE

## 2021-01-04 NOTE — PROGRESS NOTES
Date of Office Visit: 21  Encounter Provider: Raad Alcantara MD  Place of Service: [facility] CARDIOLOGY  Patient Name: Mayo Arias  :1950    Chief Complaint   Patient presents with   • Follow-up     6 month   • Coronary Artery Disease   :     HPI:  70 y.o. male, new to me, who presents today for follow-up.  Old records have been obtained and reviewed by me.  He is a patient of Dr. Alcantara's with a past cardiac history significant for coronary artery disease.  He has critical stenosis of the LAD and diagonal,  of the circumflex, and  of the RCA.  He also has an ischemic cardiomyopathy with an EF of 35%.  In  he was evaluated for CABG.  He had a viability scan, which I have reviewed, showing a large fixed defect in the [id number] anterior wall and apex with just a tiny amount of reversibility in the inferoapical wall. The defect was fixed and not felt to be viable. This was a thallium scan. Secondary to that, surgery was not recommended. He has not had intervention secondary to the complexity of the lesion but also his lack of angina.  He also has a history of ventricular tachycardia for which he has been maintained on amiodarone.    Since our last visit, he has been stable from a cardiac standpoint. He does have intermittent issues with breathing to where some days are worse than others. He is on home oxygen, but states that actually today he has been breathing much worse than prior. He is 97% on 2L nasal cannula here in my office. He denies using inhalers or steroid therapy.         Past Medical History:   Diagnosis Date   • CHF (congestive heart failure) (CMS/HCC)    • Coronary artery disease    • Stroke (CMS/HCC)        Past Surgical History:   Procedure Laterality Date   • CARDIAC CATHETERIZATION  2015    [facility] stents at [facility]   • CARDIAC CATHETERIZATION  06/18/1999    x2 stents at [facility]   • CORONARY ANGIOPLASTY WITH STENT PLACEMENT          Social History     Socioeconomic History   • Marital status:      Spouse name: Not on file   • Number of children: Not on file   • Years of education: Not on file   • Highest education level: Not on file   Tobacco Use   • Smoking status: Former Smoker     Packs/day: 1.00     Years: 15.00     Pack years: 15.00     Quit date: 2014     Years since quittin.0   • Smokeless tobacco: Never Used   Substance and Sexual Activity   • Alcohol use: No       Family History   Problem Relation Age of Onset   • Hypertension Other    • No Known Problems Mother    • No Known Problems Father    • Coronary artery disease Neg Hx        Review of Systems   Constitution: Positive for malaise/fatigue. Negative for chills and fever.   Cardiovascular: Positive for dyspnea on exertion. Negative for chest pain, leg swelling, near-syncope, orthopnea, palpitations, paroxysmal nocturnal dyspnea and syncope.   Respiratory: Negative for cough and shortness of breath.    Musculoskeletal: Negative for joint pain, joint swelling and myalgias.   Gastrointestinal: Negative for abdominal pain, diarrhea, melena, nausea and vomiting.   Genitourinary: Negative for frequency and hematuria.   Neurological: Negative for light-headedness, numbness, paresthesias and seizures.   Allergic/Immunologic: Negative.    All other systems reviewed and are negative.      Allergies   Allergen Reactions   • Naproxen Hives         Current Outpatient Medications:   •  albuterol sulfate HFA (VENTOLIN HFA) 108 (90 Base) MCG/ACT inhaler, Ventolin HFA 90 mcg/actuation aerosol inhaler  Inhale 2 puffs every 4-6 hrs PRN SOA or wheezing., Disp: , Rfl:   •  amiodarone (PACERONE) 100 MG half tablet, Take 100 mg by mouth Daily., Disp: , Rfl:   •  aspirin 81 MG tablet, Take 81 mg by mouth Daily., Disp: , Rfl:   •  carvedilol (COREG) 3.125 MG tablet, Take 3.125 mg by mouth 2 (Two) Times a Day With Meals., Disp: , Rfl:   •  clopidogrel (PLAVIX) 75 MG tablet, Take 1  "tablet by mouth daily., Disp: , Rfl:   •  fluticasone (FLONASE) 50 MCG/ACT nasal spray, , Disp: , Rfl:   •  Fluticasone-Umeclidin-Vilant (TRELEGY ELLIPTA) 100-62.5-25 MCG/INH aerosol powder , Trelegy Ellipta 100 mcg-62.5 mcg-25 mcg powder for inhalation  Inhale 1 puff every day by inhalation route., Disp: , Rfl:   •  furosemide (LASIX) 20 MG tablet, Take 10 mg by mouth Daily., Disp: , Rfl:   •  gabapentin (NEURONTIN) 400 MG capsule, Take 1 capsule by mouth 2 (two) times a day., Disp: , Rfl:   •  levothyroxine (SYNTHROID, LEVOTHROID) 88 MCG tablet, Take 75 mcg by mouth Daily., Disp: , Rfl:   •  Multiple Vitamins-Minerals (MULTIVITAMIN WITH MINERALS) tablet tablet, Take 1 tablet by mouth Daily., Disp: , Rfl:   •  ramipril (ALTACE) 2.5 MG capsule, Take 1 capsule by mouth daily., Disp: , Rfl:   •  raNITIdine (ZANTAC) 150 MG tablet, Take 150 mg by mouth 2 (Two) Times a Day., Disp: , Rfl:   •  simvastatin (ZOCOR) 10 MG tablet, Take 1 tablet by mouth Daily., Disp: , Rfl:   •  spironolactone (ALDACTONE) 25 MG tablet, 1/2 tab qd, Disp: , Rfl:   •  traMADol (ULTRAM) 50 MG tablet, Take 50 mg by mouth Every 6 (Six) Hours As Needed for Moderate Pain ., Disp: , Rfl:       Objective:     Vitals:    01/04/21 1408   BP: 102/60   Pulse: 65   Weight: 44.5 kg (98 lb)   Height: 177.8 cm (70\")     Body mass index is 14.06 kg/m².    PHYSICAL EXAM:    Physical Exam   Constitutional: He is oriented to person, place, and time. No distress.   Cachectic  In wheelchair   HENT:   Head: Normocephalic and atraumatic.   Eyes: Pupils are equal, round, and reactive to light.   Neck: No JVD present. No thyromegaly present.   Cardiovascular: Normal rate, regular rhythm, normal heart sounds and intact distal pulses.   No murmur heard.  Pulmonary/Chest: Effort normal and breath sounds normal. No respiratory distress.   Abdominal: Soft. Bowel sounds are normal. He exhibits no distension. There is no splenomegaly or hepatomegaly. There is no abdominal " tenderness.   Musculoskeletal: Normal range of motion.         General: No edema.   Neurological: He is alert and oriented to person, place, and time.   Skin: Skin is warm and dry. He is not diaphoretic. No erythema.   Psychiatric: He has a normal mood and affect. His behavior is normal. Judgment normal.         ECG 12 Lead    Date/Time: 1/4/2021 4:10 PM  Performed by: Raad Alcantara MD  Authorized by: Raad Alcantara MD   Rhythm: sinus rhythm  Rate: normal  Conduction: non-specific intraventricular conduction delay    Clinical impression: non-specific ECG  Comments: Nonspecific T wave abnormalities lateral leads.              Assessment:      No diagnosis found.  No orders of the defined types were placed in this encounter.         Plan:   70-year-old male with medical history of coronary disease with critical stenosis of the LAD and diagonal,  of the circumflex,  of the RCA, ischemic cardiomyopathy with ejection fraction of about 35%, severe COPD, and essential hypertension who presents back to me for follow-up.  He is stable from a cardiac standpoint and denies any angina.  He continues to have dyspnea at rest and with minimal levels of exertion secondary to his severe COPD.  He states that he is breathing a little bit worse today than typically.  He has no evidence of volume overload today.        1.  Coronary artery disease.  He denies any symptoms of angina.  His EKG is stable and unchanged.  He is on good medical therapy with aspirin, Plavix, and simvastatin.  -EKG has been reviewed and is unchanged from prior.    2.  Ischemic cardiomyopathy.  Ejection fraction of 35%.  He is on goal-directed medical therapy with carvedilol, ramipril, and spironolactone.  He denies any symptoms of acute heart failure.  He appears euvolemic.  -I have reviewed his medication regimen including his doses of carvedilol and ramipril which cannot be uptitrated secondary to low normal blood pressure and resting  heart rate in the low 60s.  -I have reviewed his BMP on his ramipril therapy and he has no evidence of hyperkalemia or worsening kidney function.    3.  Ventricular tachycardia.  He has a distant history of nonsustained ventricular tachycardia for which he is on low-dose amiodarone.  I would recommend he stay on this lifelong.  -TSH was ordered, however patient did not have this done.  I have recommended that he get this taken care of.  -Maintaining sinus.  No additional episodes of ventricular tachycardia documented    4.  Severe COPD: I encouraged him to touch base with Dr. Schroeder and see if any alterations can be made to his medical therapy to help from a pulmonary standpoint

## 2021-06-02 ENCOUNTER — TRANSCRIBE ORDERS (OUTPATIENT)
Dept: ADMINISTRATIVE | Facility: HOSPITAL | Age: 71
End: 2021-06-02

## 2021-06-02 ENCOUNTER — LAB (OUTPATIENT)
Dept: LAB | Facility: HOSPITAL | Age: 71
End: 2021-06-02

## 2021-06-02 DIAGNOSIS — N18.30 MALIGNANT HYPERTENSIVE HEART AND CHRONIC KIDNEY DISEASE STAGE III (HCC): Primary | ICD-10-CM

## 2021-06-02 DIAGNOSIS — N18.30 MALIGNANT HYPERTENSIVE HEART AND CHRONIC KIDNEY DISEASE STAGE III (HCC): ICD-10-CM

## 2021-06-02 DIAGNOSIS — I13.10 MALIGNANT HYPERTENSIVE HEART AND CHRONIC KIDNEY DISEASE STAGE III (HCC): Primary | ICD-10-CM

## 2021-06-02 DIAGNOSIS — I13.10 MALIGNANT HYPERTENSIVE HEART AND CHRONIC KIDNEY DISEASE STAGE III (HCC): ICD-10-CM

## 2021-06-02 LAB
ANION GAP SERPL CALCULATED.3IONS-SCNC: 9.2 MMOL/L (ref 5–15)
BILIRUB UR QL STRIP: NEGATIVE
BUN SERPL-MCNC: 27 MG/DL (ref 8–23)
BUN/CREAT SERPL: 22.1 (ref 7–25)
CALCIUM SPEC-SCNC: 9.2 MG/DL (ref 8.6–10.5)
CHLORIDE SERPL-SCNC: 97 MMOL/L (ref 98–107)
CLARITY UR: CLEAR
CO2 SERPL-SCNC: 25.8 MMOL/L (ref 22–29)
COLOR UR: YELLOW
CREAT SERPL-MCNC: 1.22 MG/DL (ref 0.76–1.27)
DEPRECATED RDW RBC AUTO: 41.8 FL (ref 37–54)
ERYTHROCYTE [DISTWIDTH] IN BLOOD BY AUTOMATED COUNT: 11.9 % (ref 12.3–15.4)
GFR SERPL CREATININE-BSD FRML MDRD: 59 ML/MIN/1.73
GLUCOSE SERPL-MCNC: 94 MG/DL (ref 65–99)
GLUCOSE UR STRIP-MCNC: NEGATIVE MG/DL
HCT VFR BLD AUTO: 36.8 % (ref 37.5–51)
HGB BLD-MCNC: 12.8 G/DL (ref 13–17.7)
HGB UR QL STRIP.AUTO: NEGATIVE
KETONES UR QL STRIP: NEGATIVE
LEUKOCYTE ESTERASE UR QL STRIP.AUTO: NEGATIVE
MCH RBC QN AUTO: 33.4 PG (ref 26.6–33)
MCHC RBC AUTO-ENTMCNC: 34.8 G/DL (ref 31.5–35.7)
MCV RBC AUTO: 96.1 FL (ref 79–97)
NITRITE UR QL STRIP: NEGATIVE
PH UR STRIP.AUTO: 6 [PH] (ref 5–8)
PLATELET # BLD AUTO: 292 10*3/MM3 (ref 140–450)
PMV BLD AUTO: 9.5 FL (ref 6–12)
POTASSIUM SERPL-SCNC: 4.3 MMOL/L (ref 3.5–5.2)
PROT UR QL STRIP: NEGATIVE
RBC # BLD AUTO: 3.83 10*6/MM3 (ref 4.14–5.8)
SODIUM SERPL-SCNC: 132 MMOL/L (ref 136–145)
SP GR UR STRIP: 1.01 (ref 1–1.03)
UROBILINOGEN UR QL STRIP: NORMAL
WBC # BLD AUTO: 9.45 10*3/MM3 (ref 3.4–10.8)

## 2021-06-02 PROCEDURE — 36415 COLL VENOUS BLD VENIPUNCTURE: CPT

## 2021-06-02 PROCEDURE — 80048 BASIC METABOLIC PNL TOTAL CA: CPT

## 2021-06-02 PROCEDURE — 81003 URINALYSIS AUTO W/O SCOPE: CPT

## 2021-06-02 PROCEDURE — 85027 COMPLETE CBC AUTOMATED: CPT

## 2021-12-17 ENCOUNTER — TELEPHONE (OUTPATIENT)
Dept: CARDIOLOGY | Facility: CLINIC | Age: 71
End: 2021-12-17

## 2022-01-31 ENCOUNTER — HOSPITAL ENCOUNTER (EMERGENCY)
Facility: HOSPITAL | Age: 72
Discharge: HOME | End: 2022-02-01
Attending: EMERGENCY MEDICINE | Admitting: EMERGENCY MEDICINE

## 2022-01-31 DIAGNOSIS — R04.0 EPISTAXIS: Primary | ICD-10-CM

## 2022-01-31 LAB
ANION GAP SERPL CALCULATED.3IONS-SCNC: 10.3 MMOL/L (ref 5–15)
BASOPHILS # BLD AUTO: 0.02 10*3/MM3 (ref 0–0.2)
BASOPHILS NFR BLD AUTO: 0.2 % (ref 0–1.5)
BUN SERPL-MCNC: 22 MG/DL (ref 8–23)
BUN/CREAT SERPL: 18 (ref 7–25)
CALCIUM SPEC-SCNC: 8.9 MG/DL (ref 8.6–10.5)
CHLORIDE SERPL-SCNC: 100 MMOL/L (ref 98–107)
CO2 SERPL-SCNC: 20.7 MMOL/L (ref 22–29)
CREAT SERPL-MCNC: 1.22 MG/DL (ref 0.76–1.27)
DEPRECATED RDW RBC AUTO: 46.2 FL (ref 37–54)
EOSINOPHIL # BLD AUTO: 0.01 10*3/MM3 (ref 0–0.4)
EOSINOPHIL NFR BLD AUTO: 0.1 % (ref 0.3–6.2)
ERYTHROCYTE [DISTWIDTH] IN BLOOD BY AUTOMATED COUNT: 13 % (ref 12.3–15.4)
GFR SERPL CREATININE-BSD FRML MDRD: 59 ML/MIN/1.73
GLUCOSE SERPL-MCNC: 148 MG/DL (ref 65–99)
HCT VFR BLD AUTO: 34.1 % (ref 37.5–51)
HGB BLD-MCNC: 11.5 G/DL (ref 13–17.7)
IMM GRANULOCYTES # BLD AUTO: 0.04 10*3/MM3 (ref 0–0.05)
IMM GRANULOCYTES NFR BLD AUTO: 0.4 % (ref 0–0.5)
LYMPHOCYTES # BLD AUTO: 0.92 10*3/MM3 (ref 0.7–3.1)
LYMPHOCYTES NFR BLD AUTO: 10 % (ref 19.6–45.3)
MCH RBC QN AUTO: 32.7 PG (ref 26.6–33)
MCHC RBC AUTO-ENTMCNC: 33.7 G/DL (ref 31.5–35.7)
MCV RBC AUTO: 96.9 FL (ref 79–97)
MONOCYTES # BLD AUTO: 0.5 10*3/MM3 (ref 0.1–0.9)
MONOCYTES NFR BLD AUTO: 5.4 % (ref 5–12)
NEUTROPHILS NFR BLD AUTO: 7.73 10*3/MM3 (ref 1.7–7)
NEUTROPHILS NFR BLD AUTO: 83.9 % (ref 42.7–76)
NRBC BLD AUTO-RTO: 0.1 /100 WBC (ref 0–0.2)
PLATELET # BLD AUTO: 210 10*3/MM3 (ref 140–450)
PMV BLD AUTO: 9.6 FL (ref 6–12)
POTASSIUM SERPL-SCNC: 4.5 MMOL/L (ref 3.5–5.2)
RBC # BLD AUTO: 3.52 10*6/MM3 (ref 4.14–5.8)
SODIUM SERPL-SCNC: 131 MMOL/L (ref 136–145)
WBC NRBC COR # BLD: 9.22 10*3/MM3 (ref 3.4–10.8)

## 2022-01-31 PROCEDURE — 99283 EMERGENCY DEPT VISIT LOW MDM: CPT

## 2022-01-31 PROCEDURE — 36415 COLL VENOUS BLD VENIPUNCTURE: CPT

## 2022-01-31 PROCEDURE — 85025 COMPLETE CBC W/AUTO DIFF WBC: CPT | Performed by: EMERGENCY MEDICINE

## 2022-01-31 PROCEDURE — 80048 BASIC METABOLIC PNL TOTAL CA: CPT | Performed by: EMERGENCY MEDICINE

## 2022-01-31 RX ORDER — ECHINACEA PURPUREA EXTRACT 125 MG
1 TABLET ORAL AS NEEDED
Qty: 44 ML | Refills: 0 | Status: SHIPPED | OUTPATIENT
Start: 2022-01-31

## 2022-01-31 RX ADMIN — PHENYLEPHRINE HYDROCHLORIDE 2 SPRAY: 0.5 SPRAY NASAL at 22:38

## 2022-02-01 VITALS
HEART RATE: 84 BPM | OXYGEN SATURATION: 98 % | TEMPERATURE: 97.8 F | SYSTOLIC BLOOD PRESSURE: 129 MMHG | RESPIRATION RATE: 18 BRPM | DIASTOLIC BLOOD PRESSURE: 74 MMHG

## 2022-02-01 NOTE — ED PROVIDER NOTES
EMERGENCY DEPARTMENT ENCOUNTER    Room Number:  21/21  Date of encounter:  1/31/2022  PCP: Reyes Garcia MD  Patient Care Team:  Reyes Garcia MD as PCP - General (Family Medicine)  Raad Alcantara MD as Consulting Physician (Cardiology)   Historian: Patient, EMS     HPI:  Chief Complaint: Nosebleed  A complete HPI/ROS/PMH/PSH/SH/FH are unobtainable due to: Nothing    Context: Mayo Arias is a 71 y.o. male who presents to the ED c/o having a nosebleed.  He reports that he has had intermittent bleeding from his left nare for the last 4 hours.  He is on Plavix.  He denies prior similar episodes.  He is on oxygen at home.  EMS placed a nasal clamp with improvement.  Upon arrival here he is hemostatic.  He cannot quantify the amount of blood that he lost.  He denies pain.    Prior record review: Cardiology note 1/4/2021 for coronary artery disease.    PAST MEDICAL HISTORY  Active Ambulatory Problems     Diagnosis Date Noted   • Hypertension 03/17/2016   • Edema 03/17/2016   • Chronic coronary artery disease 03/17/2016   • Congestive heart failure (HCC) 03/17/2016   • COPD (chronic obstructive pulmonary disease) with emphysema ([facility]) 03/17/2016   • Cardiomyopathy, ischemic 03/17/2016   • CKD (chronic kidney disease), stage III ([facility]) 03/17/2016   • Cachexia ([facility]) 12/19/2019   • Ventricular tachycardia (paroxysmal) ([facility]) 06/25/2020     Resolved Ambulatory Problems     Diagnosis Date Noted   • No Resolved Ambulatory Problems     Past Medical History:   Diagnosis Date   • CHF (congestive heart failure) (CMS/HCC)    • Coronary artery disease    • Stroke (CMS/[facility])        The patient qualifies to receive the vaccine, but they have not yet received it.    PAST SURGICAL HISTORY  Past Surgical History:   Procedure Laterality Date   • CARDIAC CATHETERIZATION  02/23/2015    no stents at [facility]   • CARDIAC CATHETERIZATION  06/18/1999    x2 stents at [facility]   • CORONARY ANGIOPLASTY WITH STENT PLACEMENT           FAMILY  HISTORY  Family History   Problem Relation Age of Onset   • Hypertension Other    • No Known Problems Mother    • No Known Problems Father    • Coronary artery disease Neg Hx          SOCIAL HISTORY  Social History     Socioeconomic History   • Marital status:    Tobacco Use   • Smoking status: Former Smoker     Packs/day: 1.00     Years: 15.00     Pack years: 15.00     Quit date: 2014     Years since quittin.0   • Smokeless tobacco: Never Used   Substance and Sexual Activity   • Alcohol use: No         ALLERGIES  Naproxen        REVIEW OF SYSTEMS  Review of Systems   No chest pain, no shortness of breath, no nausea, no vomiting, no fever, no chills, no nasal pain, positive easy bleeding  All systems reviewed and negative except for those discussed in HPI.       PHYSICAL EXAM    I have reviewed the triage vital signs and nursing notes.    ED Triage Vitals [22]   Temp Heart Rate Resp BP SpO2   97.8 °F (36.6 °C) 91 18 115/69 94 %      Temp src Heart Rate Source Patient Position BP Location FiO2 (%)   -- -- -- -- --       Physical Exam  GENERAL: Awake, alert, no acute distress  SKIN: Warm, dry  HENT: Normocephalic, atraumatic.  Nasal clamp in place.  No active bleeding.  EYES: no scleral icterus  CV: regular rhythm, regular rate  RESPIRATORY: normal effort, lungs clear  ABDOMEN: soft, nontender, nondistended  MUSCULOSKELETAL: no deformity  NEURO: alert, moves all extremities, follows commands          LAB RESULTS  Recent Results (from the past 24 hour(s))   Basic Metabolic Panel    Collection Time: 22 10:11 PM    Specimen: Blood   Result Value Ref Range    Glucose 148 (H) 65 - 99 mg/dL    BUN 22 8 - 23 mg/dL    Creatinine 1.22 0.76 - 1.27 mg/dL    Sodium 131 (L) 136 - 145 mmol/L    Potassium 4.5 3.5 - 5.2 mmol/L    Chloride 100 98 - 107 mmol/L    CO2 20.7 (L) 22.0 - 29.0 mmol/L    Calcium 8.9 8.6 - 10.5 mg/dL    eGFR Non African Amer 59 (L) >60 mL/min/1.73    BUN/Creatinine Ratio 18.0  7.0 - 25.0    Anion Gap 10.3 5.0 - 15.0 mmol/L   CBC Auto Differential    Collection Time: 01/31/22 10:11 PM    Specimen: Blood   Result Value Ref Range    WBC 9.22 3.40 - 10.80 10*3/mm3    RBC 3.52 (L) 4.14 - 5.80 10*6/mm3    Hemoglobin 11.5 (L) 13.0 - 17.7 g/dL    Hematocrit 34.1 (L) 37.5 - 51.0 %    MCV 96.9 79.0 - 97.0 fL    MCH 32.7 26.6 - 33.0 pg    MCHC 33.7 31.5 - 35.7 g/dL    RDW 13.0 12.3 - 15.4 %    RDW-SD 46.2 37.0 - 54.0 fl    MPV 9.6 6.0 - 12.0 fL    Platelets 210 140 - 450 10*3/mm3    Neutrophil % 83.9 (H) 42.7 - 76.0 %    Lymphocyte % 10.0 (L) 19.6 - 45.3 %    Monocyte % 5.4 5.0 - 12.0 %    Eosinophil % 0.1 (L) 0.3 - 6.2 %    Basophil % 0.2 0.0 - 1.5 %    Immature Grans % 0.4 0.0 - 0.5 %    Neutrophils, Absolute 7.73 (H) 1.70 - 7.00 10*3/mm3    Lymphocytes, Absolute 0.92 0.70 - 3.10 10*3/mm3    Monocytes, Absolute 0.50 0.10 - 0.90 10*3/mm3    Eosinophils, Absolute 0.01 0.00 - 0.40 10*3/mm3    Basophils, Absolute 0.02 0.00 - 0.20 10*3/mm3    Immature Grans, Absolute 0.04 0.00 - 0.05 10*3/mm3    nRBC 0.1 0.0 - 0.2 /100 WBC       Ordered the above labs and independently reviewed the results.        RADIOLOGY  No Radiology Exams Resulted Within Past 24 Hours    I ordered the above noted radiological studies. Reviewed by me and discussed with radiologist.  See dictation for official radiology interpretation.      PROCEDURES    Procedures      MEDICATIONS GIVEN IN ER    Medications   phenylephrine (JORGE-SYNEPHRINE) 0.5 % nasal spray 2 spray (2 sprays Each Nare Given 1/31/22 2238)         PROGRESS, DATA ANALYSIS, CONSULTS, AND MEDICAL DECISION MAKING    All labs have been independently reviewed by me.  All radiology studies have been reviewed by me and discussed with radiologist dictating the report.   EKG's independently viewed and interpreted by me.  Discussion below represents my analysis of pertinent findings related to patient's condition, differential diagnosis, treatment plan and final  disposition.    Differential diagnosis includes but is not limited to epistaxis, nasal trauma, dry mucous membranes, nasal laceration, anemia.    ED Course as of 01/31/22 2344 Mon Jan 31, 2022 2333 Hemoglobin(!): 11.5 [TR]   2339 The patient is hemostatic. [TR]   2343 With his consistent oxygen use, I suspect that he has developed irritation of his nare from the nasal cannula as well as from the dry oxygen air.  Plan to place him on Ocean Spray twice a day. [TR]      ED Course User Index  [TR] Tony Dozier MD           PPE: The patient wore a mask and I wore an N95 mask throughout the entire patient encounter.       AS OF 23:44 EST VITALS:    BP - 119/75  HR - 74  TEMP - 97.8 °F (36.6 °C)  O2 SATS - 100%        DIAGNOSIS  Final diagnoses:   Epistaxis         DISPOSITION  ED Disposition     ED Disposition Condition Comment    Discharge Stable                 Tony Dozier MD  01/31/22 2344

## 2022-02-01 NOTE — DISCHARGE INSTRUCTIONS
Use the nasal spray as directed twice per day for the next week.  If you start bleeding again, apply direct pressure to your nose.  If it does not stop after 15 minutes, return to the emergency room.

## 2022-02-01 NOTE — ED TRIAGE NOTES
Pt has nose bleed left villanueva x 4 hours pt is on plavix. No trauma. EMS put tampon in pt nose that has helped slow bleed.     Pt arrives in triage with mask on. Triage staff wearing N95 masks and goggles.

## 2022-05-09 ENCOUNTER — LAB (OUTPATIENT)
Dept: LAB | Facility: HOSPITAL | Age: 72
End: 2022-05-09

## 2022-05-09 ENCOUNTER — TRANSCRIBE ORDERS (OUTPATIENT)
Dept: LAB | Facility: HOSPITAL | Age: 72
End: 2022-05-09

## 2022-05-09 DIAGNOSIS — N18.31 CHRONIC KIDNEY DISEASE (CKD) STAGE G3A/A1, MODERATELY DECREASED GLOMERULAR FILTRATION RATE (GFR) BETWEEN 45-59 ML/MIN/1.73 SQUARE METER AND ALBUMINURIA CREATININE RATIO LESS THAN 30 MG/G (CMS/H*: Primary | ICD-10-CM

## 2022-05-09 DIAGNOSIS — N18.31 CHRONIC KIDNEY DISEASE (CKD) STAGE G3A/A1, MODERATELY DECREASED GLOMERULAR FILTRATION RATE (GFR) BETWEEN 45-59 ML/MIN/1.73 SQUARE METER AND ALBUMINURIA CREATININE RATIO LESS THAN 30 MG/G (CMS/H*: ICD-10-CM

## 2022-05-09 LAB
ALBUMIN SERPL-MCNC: 3.6 G/DL (ref 3.5–5.2)
ALBUMIN UR-MCNC: <1.2 MG/DL
ALBUMIN/GLOB SERPL: 1 G/DL
ALP SERPL-CCNC: 71 U/L (ref 39–117)
ALT SERPL W P-5'-P-CCNC: 14 U/L (ref 1–41)
ANION GAP SERPL CALCULATED.3IONS-SCNC: 9.2 MMOL/L (ref 5–15)
AST SERPL-CCNC: 16 U/L (ref 1–40)
BILIRUB SERPL-MCNC: 0.3 MG/DL (ref 0–1.2)
BILIRUB UR QL STRIP: NEGATIVE
BUN SERPL-MCNC: 21 MG/DL (ref 8–23)
BUN/CREAT SERPL: 18.4 (ref 7–25)
CALCIUM SPEC-SCNC: 9 MG/DL (ref 8.6–10.5)
CHLORIDE SERPL-SCNC: 100 MMOL/L (ref 98–107)
CLARITY UR: CLEAR
CO2 SERPL-SCNC: 22.8 MMOL/L (ref 22–29)
COLOR UR: YELLOW
CREAT SERPL-MCNC: 1.14 MG/DL (ref 0.76–1.27)
CREAT UR-MCNC: 75.4 MG/DL
DEPRECATED RDW RBC AUTO: 42.8 FL (ref 37–54)
EGFRCR SERPLBLD CKD-EPI 2021: 68.3 ML/MIN/1.73
ERYTHROCYTE [DISTWIDTH] IN BLOOD BY AUTOMATED COUNT: 12.5 % (ref 12.3–15.4)
GLOBULIN UR ELPH-MCNC: 3.7 GM/DL
GLUCOSE SERPL-MCNC: 79 MG/DL (ref 65–99)
GLUCOSE UR STRIP-MCNC: NEGATIVE MG/DL
HCT VFR BLD AUTO: 35.2 % (ref 37.5–51)
HGB BLD-MCNC: 12 G/DL (ref 13–17.7)
HGB UR QL STRIP.AUTO: NEGATIVE
KETONES UR QL STRIP: NEGATIVE
LEUKOCYTE ESTERASE UR QL STRIP.AUTO: NEGATIVE
MCH RBC QN AUTO: 31.9 PG (ref 26.6–33)
MCHC RBC AUTO-ENTMCNC: 34.1 G/DL (ref 31.5–35.7)
MCV RBC AUTO: 93.6 FL (ref 79–97)
MICROALBUMIN/CREAT UR: NORMAL MG/G{CREAT}
NITRITE UR QL STRIP: NEGATIVE
PH UR STRIP.AUTO: 7 [PH] (ref 5–8)
PLATELET # BLD AUTO: 266 10*3/MM3 (ref 140–450)
PMV BLD AUTO: 9.4 FL (ref 6–12)
POTASSIUM SERPL-SCNC: 4.6 MMOL/L (ref 3.5–5.2)
PROT SERPL-MCNC: 7.3 G/DL (ref 6–8.5)
PROT UR QL STRIP: NEGATIVE
RBC # BLD AUTO: 3.76 10*6/MM3 (ref 4.14–5.8)
SODIUM SERPL-SCNC: 132 MMOL/L (ref 136–145)
SP GR UR STRIP: 1.02 (ref 1–1.03)
UROBILINOGEN UR QL STRIP: NORMAL
WBC NRBC COR # BLD: 7.05 10*3/MM3 (ref 3.4–10.8)

## 2022-05-09 PROCEDURE — 82043 UR ALBUMIN QUANTITATIVE: CPT

## 2022-05-09 PROCEDURE — 81003 URINALYSIS AUTO W/O SCOPE: CPT

## 2022-05-09 PROCEDURE — 80053 COMPREHEN METABOLIC PANEL: CPT

## 2022-05-09 PROCEDURE — 36415 COLL VENOUS BLD VENIPUNCTURE: CPT

## 2022-05-09 PROCEDURE — 85027 COMPLETE CBC AUTOMATED: CPT

## 2022-05-09 PROCEDURE — 82570 ASSAY OF URINE CREATININE: CPT

## 2022-11-03 ENCOUNTER — TRANSCRIBE ORDERS (OUTPATIENT)
Dept: ADMINISTRATIVE | Facility: HOSPITAL | Age: 72
End: 2022-11-03

## 2022-11-03 ENCOUNTER — LAB (OUTPATIENT)
Dept: LAB | Facility: HOSPITAL | Age: 72
End: 2022-11-03

## 2022-11-03 DIAGNOSIS — E55.9 AVITAMINOSIS D: ICD-10-CM

## 2022-11-03 DIAGNOSIS — I50.22 CHRONIC SYSTOLIC HEART FAILURE: ICD-10-CM

## 2022-11-03 DIAGNOSIS — E03.9 MYXEDEMA HEART DISEASE: ICD-10-CM

## 2022-11-03 DIAGNOSIS — I51.9 MYXEDEMA HEART DISEASE: ICD-10-CM

## 2022-11-03 DIAGNOSIS — N18.31 CHRONIC KIDNEY DISEASE (CKD) STAGE G3A/A1, MODERATELY DECREASED GLOMERULAR FILTRATION RATE (GFR) BETWEEN 45-59 ML/MIN/1.73 SQUARE METER AND ALBUMINURIA CREATININE RATIO LESS THAN 30 MG/G (CMS/H*: ICD-10-CM

## 2022-11-03 DIAGNOSIS — N18.31 CHRONIC KIDNEY DISEASE (CKD) STAGE G3A/A1, MODERATELY DECREASED GLOMERULAR FILTRATION RATE (GFR) BETWEEN 45-59 ML/MIN/1.73 SQUARE METER AND ALBUMINURIA CREATININE RATIO LESS THAN 30 MG/G (CMS/H*: Primary | ICD-10-CM

## 2022-11-03 DIAGNOSIS — Z12.5 SPECIAL SCREENING FOR MALIGNANT NEOPLASM OF PROSTATE: ICD-10-CM

## 2022-11-03 DIAGNOSIS — N18.30 STAGE 3 CHRONIC KIDNEY DISEASE, UNSPECIFIED WHETHER STAGE 3A OR 3B CKD: ICD-10-CM

## 2022-11-03 DIAGNOSIS — E78.5 HYPERLIPIDEMIA, UNSPECIFIED HYPERLIPIDEMIA TYPE: ICD-10-CM

## 2022-11-03 DIAGNOSIS — N18.30 STAGE 3 CHRONIC KIDNEY DISEASE, UNSPECIFIED WHETHER STAGE 3A OR 3B CKD: Primary | ICD-10-CM

## 2022-11-03 LAB
25(OH)D3 SERPL-MCNC: 68.4 NG/ML (ref 30–100)
ALBUMIN SERPL-MCNC: 3.5 G/DL (ref 3.5–5.2)
ALBUMIN UR-MCNC: <1.2 MG/DL
ALBUMIN/GLOB SERPL: 0.9 G/DL
ALP SERPL-CCNC: 76 U/L (ref 39–117)
ALT SERPL W P-5'-P-CCNC: 10 U/L (ref 1–41)
ANION GAP SERPL CALCULATED.3IONS-SCNC: 8.7 MMOL/L (ref 5–15)
AST SERPL-CCNC: 16 U/L (ref 1–40)
BASOPHILS # BLD AUTO: 0.02 10*3/MM3 (ref 0–0.2)
BASOPHILS NFR BLD AUTO: 0.3 % (ref 0–1.5)
BILIRUB SERPL-MCNC: 0.4 MG/DL (ref 0–1.2)
BILIRUB UR QL STRIP: NEGATIVE
BUN SERPL-MCNC: 21 MG/DL (ref 8–23)
BUN/CREAT SERPL: 17.8 (ref 7–25)
CALCIUM SPEC-SCNC: 9.1 MG/DL (ref 8.6–10.5)
CHLORIDE SERPL-SCNC: 100 MMOL/L (ref 98–107)
CHOLEST SERPL-MCNC: 122 MG/DL (ref 0–200)
CLARITY UR: CLEAR
CO2 SERPL-SCNC: 27.3 MMOL/L (ref 22–29)
COLOR UR: YELLOW
CREAT SERPL-MCNC: 1.18 MG/DL (ref 0.76–1.27)
CREAT UR-MCNC: 53 MG/DL
DEPRECATED RDW RBC AUTO: 45.1 FL (ref 37–54)
EGFRCR SERPLBLD CKD-EPI 2021: 65.6 ML/MIN/1.73
EOSINOPHIL # BLD AUTO: 0.04 10*3/MM3 (ref 0–0.4)
EOSINOPHIL NFR BLD AUTO: 0.5 % (ref 0.3–6.2)
ERYTHROCYTE [DISTWIDTH] IN BLOOD BY AUTOMATED COUNT: 12.6 % (ref 12.3–15.4)
GLOBULIN UR ELPH-MCNC: 3.7 GM/DL
GLUCOSE SERPL-MCNC: 86 MG/DL (ref 65–99)
GLUCOSE UR STRIP-MCNC: NEGATIVE MG/DL
HCT VFR BLD AUTO: 36.3 % (ref 37.5–51)
HDLC SERPL-MCNC: 58 MG/DL (ref 40–60)
HGB BLD-MCNC: 11.9 G/DL (ref 13–17.7)
HGB UR QL STRIP.AUTO: NEGATIVE
IMM GRANULOCYTES # BLD AUTO: 0.02 10*3/MM3 (ref 0–0.05)
IMM GRANULOCYTES NFR BLD AUTO: 0.3 % (ref 0–0.5)
KETONES UR QL STRIP: NEGATIVE
LDLC SERPL CALC-MCNC: 52 MG/DL (ref 0–100)
LDLC/HDLC SERPL: 0.93 {RATIO}
LEUKOCYTE ESTERASE UR QL STRIP.AUTO: NEGATIVE
LYMPHOCYTES # BLD AUTO: 1.66 10*3/MM3 (ref 0.7–3.1)
LYMPHOCYTES NFR BLD AUTO: 21.4 % (ref 19.6–45.3)
MCH RBC QN AUTO: 32.2 PG (ref 26.6–33)
MCHC RBC AUTO-ENTMCNC: 32.8 G/DL (ref 31.5–35.7)
MCV RBC AUTO: 98.1 FL (ref 79–97)
MICROALBUMIN/CREAT UR: NORMAL MG/G{CREAT}
MONOCYTES # BLD AUTO: 0.51 10*3/MM3 (ref 0.1–0.9)
MONOCYTES NFR BLD AUTO: 6.6 % (ref 5–12)
NEUTROPHILS NFR BLD AUTO: 5.5 10*3/MM3 (ref 1.7–7)
NEUTROPHILS NFR BLD AUTO: 70.9 % (ref 42.7–76)
NITRITE UR QL STRIP: NEGATIVE
NRBC BLD AUTO-RTO: 0 /100 WBC (ref 0–0.2)
NT-PROBNP SERPL-MCNC: 1491 PG/ML (ref 0–900)
PH UR STRIP.AUTO: 7 [PH] (ref 5–8)
PLATELET # BLD AUTO: 232 10*3/MM3 (ref 140–450)
PMV BLD AUTO: 9.4 FL (ref 6–12)
POTASSIUM SERPL-SCNC: 4.8 MMOL/L (ref 3.5–5.2)
PROT SERPL-MCNC: 7.2 G/DL (ref 6–8.5)
PROT UR QL STRIP: NEGATIVE
PSA SERPL-MCNC: 0.46 NG/ML (ref 0–4)
RBC # BLD AUTO: 3.7 10*6/MM3 (ref 4.14–5.8)
SODIUM SERPL-SCNC: 136 MMOL/L (ref 136–145)
SP GR UR STRIP: 1.01 (ref 1–1.03)
T4 SERPL-MCNC: 11.5 MCG/DL (ref 4.5–11.7)
TRIGL SERPL-MCNC: 50 MG/DL (ref 0–150)
TSH SERPL DL<=0.05 MIU/L-ACNC: 3.66 UIU/ML (ref 0.27–4.2)
UROBILINOGEN UR QL STRIP: NORMAL
VLDLC SERPL-MCNC: 12 MG/DL (ref 5–40)
WBC NRBC COR # BLD: 7.75 10*3/MM3 (ref 3.4–10.8)

## 2022-11-03 PROCEDURE — 80061 LIPID PANEL: CPT

## 2022-11-03 PROCEDURE — 82306 VITAMIN D 25 HYDROXY: CPT

## 2022-11-03 PROCEDURE — 81003 URINALYSIS AUTO W/O SCOPE: CPT

## 2022-11-03 PROCEDURE — 82570 ASSAY OF URINE CREATININE: CPT

## 2022-11-03 PROCEDURE — G0103 PSA SCREENING: HCPCS

## 2022-11-03 PROCEDURE — 84443 ASSAY THYROID STIM HORMONE: CPT

## 2022-11-03 PROCEDURE — 80053 COMPREHEN METABOLIC PANEL: CPT

## 2022-11-03 PROCEDURE — 82043 UR ALBUMIN QUANTITATIVE: CPT

## 2022-11-03 PROCEDURE — 84436 ASSAY OF TOTAL THYROXINE: CPT

## 2022-11-03 PROCEDURE — 36415 COLL VENOUS BLD VENIPUNCTURE: CPT

## 2022-11-03 PROCEDURE — 85025 COMPLETE CBC W/AUTO DIFF WBC: CPT

## 2022-11-03 PROCEDURE — 83880 ASSAY OF NATRIURETIC PEPTIDE: CPT

## 2022-11-28 ENCOUNTER — OFFICE VISIT (OUTPATIENT)
Dept: CARDIOLOGY | Facility: CLINIC | Age: 72
End: 2022-11-28

## 2022-11-28 VITALS
HEIGHT: 70 IN | WEIGHT: 95 LBS | SYSTOLIC BLOOD PRESSURE: 98 MMHG | DIASTOLIC BLOOD PRESSURE: 64 MMHG | BODY MASS INDEX: 13.6 KG/M2 | HEART RATE: 66 BPM

## 2022-11-28 DIAGNOSIS — I25.5 CARDIOMYOPATHY, ISCHEMIC: Primary | ICD-10-CM

## 2022-11-28 DIAGNOSIS — R64 CACHEXIA: ICD-10-CM

## 2022-11-28 DIAGNOSIS — I50.22 CHRONIC SYSTOLIC CONGESTIVE HEART FAILURE: ICD-10-CM

## 2022-11-28 DIAGNOSIS — I10 ESSENTIAL HYPERTENSION: ICD-10-CM

## 2022-11-28 PROCEDURE — 93000 ELECTROCARDIOGRAM COMPLETE: CPT | Performed by: INTERNAL MEDICINE

## 2022-11-28 PROCEDURE — 99214 OFFICE O/P EST MOD 30 MIN: CPT | Performed by: INTERNAL MEDICINE

## 2022-11-28 NOTE — PROGRESS NOTES
Date of Office Visit: 22  Encounter Provider: Raad Alcantara MD  Place of Service: [facility] CARDIOLOGY  Patient Name: Mayo Arias  :1950    Chief Complaint   Patient presents with   • Coronary Artery Disease   • Cardiomyopathy   • Follow-up     2 year   :     HPI:  72 y.o. male, new to me, who presents today for follow-up.  Old records have been obtained and reviewed by me.  He has a cardiac history significant for coronary artery disease.  He has critical stenosis of the LAD and diagonal,  of the circumflex, and  of the RCA.  He also has an ischemic cardiomyopathy with an EF of 35%.  In  he was evaluated for CABG.  He had a viability scan, which I have reviewed, showing a large fixed defect in the [id number] anterior wall and apex with just a tiny amount of reversibility in the inferoapical wall. The defect was fixed and not felt to be viable. This was a thallium scan. Secondary to that, surgery was not recommended. He has not had intervention secondary to the complexity of the lesion but also his lack of angina.  He also has a history of ventricular tachycardia for which he has been maintained on amiodarone.    He presents back in follow-up and continues to look very frail and ill.  He denies any chest pain or dyspnea.  No orthopnea or PND reported.  He does have lower extremity edema and did subsequently have his Lasix and spironolactone increased due to that.    Past Medical History:   Diagnosis Date   • CHF (congestive heart failure) ([facility])    • Coronary artery disease    • Stroke ([facility])        Past Surgical History:   Procedure Laterality Date   • CARDIAC CATHETERIZATION  2015    no stents at [facility]   • CARDIAC CATHETERIZATION  06/18/1999    x2 stents at [facility]   • CORONARY ANGIOPLASTY WITH STENT PLACEMENT         Social History     Socioeconomic History   • Marital status:    Tobacco Use   • Smoking status: Former     Packs/day:  1.00     Years: 15.00     Pack years: 15.00     Types: Cigarettes     Quit date: 2014     Years since quittin.9   • Smokeless tobacco: Never   Substance and Sexual Activity   • Alcohol use: No   • Drug use: Defer       Family History   Problem Relation Age of Onset   • Hypertension Other    • No Known Problems Mother    • No Known Problems Father    • Coronary artery disease Neg Hx        Review of Systems   Constitutional: Positive for malaise/fatigue. Negative for chills and fever.   Cardiovascular: Positive for dyspnea on exertion. Negative for chest pain, leg swelling, near-syncope, orthopnea, palpitations, paroxysmal nocturnal dyspnea and syncope.   Respiratory: Negative for cough and shortness of breath.    Musculoskeletal: Negative for joint pain, joint swelling and myalgias.   Gastrointestinal: Negative for abdominal pain, diarrhea, melena, nausea and vomiting.   Genitourinary: Negative for frequency and hematuria.   Neurological: Negative for light-headedness, numbness, paresthesias and seizures.   Allergic/Immunologic: Negative.    All other systems reviewed and are negative.      Allergies   Allergen Reactions   • Naproxen Hives         Current Outpatient Medications:   •  albuterol sulfate  (90 Base) MCG/ACT inhaler, Ventolin HFA 90 mcg/actuation aerosol inhaler  Inhale 2 puffs every 4-6 hrs PRN SOA or wheezing., Disp: , Rfl:   •  amiodarone (PACERONE) 100 MG half tablet, Take 100 mg by mouth Daily., Disp: , Rfl:   •  aspirin 81 MG tablet, Take 81 mg by mouth Daily., Disp: , Rfl:   •  carvedilol (COREG) 3.125 MG tablet, Take 3.125 mg by mouth 2 (Two) Times a Day With Meals., Disp: , Rfl:   •  clopidogrel (PLAVIX) 75 MG tablet, Take 1 tablet by mouth daily., Disp: , Rfl:   •  fluticasone (FLONASE) 50 MCG/ACT nasal spray, , Disp: , Rfl:   •  furosemide (LASIX) 20 MG tablet, Take 20 mg by mouth Daily., Disp: , Rfl:   •  levothyroxine (SYNTHROID, LEVOTHROID) 88 MCG tablet, Take 75 mcg by mouth  "Daily., Disp: , Rfl:   •  Multiple Vitamins-Minerals (MULTIVITAMIN WITH MINERALS) tablet tablet, Take 1 tablet by mouth Daily., Disp: , Rfl:   •  ramipril (ALTACE) 2.5 MG capsule, Take 1 capsule by mouth daily., Disp: , Rfl:   •  simvastatin (ZOCOR) 10 MG tablet, Take 1 tablet by mouth Daily., Disp: , Rfl:   •  sodium chloride (Ocean Nasal Spray) 0.65 % nasal spray, 1 spray into the nostril(s) as directed by provider As Needed for Congestion., Disp: 44 mL, Rfl: 0  •  spironolactone (ALDACTONE) 25 MG tablet, Take 25 mg by mouth Daily., Disp: , Rfl:   •  traMADol (ULTRAM) 50 MG tablet, Take 50 mg by mouth Every 6 (Six) Hours As Needed for Moderate Pain ., Disp: , Rfl:       Objective:     Vitals:    11/28/22 1304   BP: 98/64   Pulse: 66   Weight: 43.1 kg (95 lb)   Height: 177.8 cm (70\")     Body mass index is 13.63 kg/m².    PHYSICAL EXAM:    Physical Exam  Constitutional:       General: He is not in acute distress.     Appearance: He is not diaphoretic.      Comments: Cachectic  In wheelchair   HENT:      Head: Normocephalic and atraumatic.   Eyes:      Pupils: Pupils are equal, round, and reactive to light.   Neck:      Thyroid: No thyromegaly.      Vascular: No JVD.   Cardiovascular:      Rate and Rhythm: Normal rate and regular rhythm.      Pulses: Intact distal pulses.      Heart sounds: Normal heart sounds. No murmur heard.  Pulmonary:      Effort: Pulmonary effort is normal. No respiratory distress.      Breath sounds: Normal breath sounds.   Abdominal:      General: Bowel sounds are normal. There is no distension.      Palpations: Abdomen is soft. There is no hepatomegaly or splenomegaly.      Tenderness: There is no abdominal tenderness.   Musculoskeletal:         General: Normal range of motion.      Right lower leg: Edema present.      Left lower leg: Edema present.   Skin:     General: Skin is warm and dry.      Findings: No erythema.   Neurological:      Mental Status: He is alert and oriented to person, " place, and time.   Psychiatric:         Behavior: Behavior normal.         Judgment: Judgment normal.           ECG 12 Lead    Date/Time: 11/28/2022 1:25 PM  Performed by: Raad Alcantara MD  Authorized by: Raad Alcantara MD   Comparison: compared with previous ECG from 1/4/2021  Similar to previous ECG  Rhythm: sinus rhythm  Conduction: non-specific intraventricular conduction delay  Other findings: left ventricular hypertrophy with strain                Assessment:      No diagnosis found.  No orders of the defined types were placed in this encounter.    Plan:   72-year-old male with medical history of coronary disease with critical stenosis of the LAD and diagonal,  of the circumflex,  of the RCA, ischemic cardiomyopathy with ejection fraction of about 35%, severe COPD, and essential hypertension who presents back to me for follow-up.  He is stable from a cardiac standpoint and denies any angina.  He continues to have dyspnea at rest and with minimal levels of exertion secondary to his severe COPD.  No recent issues with chest pain orthopnea, or PND.    1.  Coronary artery disease.  He denies any symptoms of angina.  His EKG is stable and unchanged.  He is on good medical therapy with aspirin, Plavix, and simvastatin.  -EKG has been reviewed and is unchanged from prior.    2.  Ischemic cardiomyopathy.  Ejection fraction of 35%.  He is on goal-directed medical therapy with carvedilol, ramipril, and spironolactone.  He denies any symptoms of acute heart failure.  He appears euvolemic.  -I have reviewed his medication regimen including his doses of carvedilol and ramipril which cannot be uptitrated secondary to low normal blood pressure   -Agree with the recent increase in his Lasix and spironolactone therapy.  He seems to be tolerating this well.    3.  Ventricular tachycardia.  He has a distant history of nonsustained ventricular tachycardia for which he is on low-dose amiodarone.  I would  recommend he stay on this lifelong.  -Maintaining sinus.  No additional episodes of ventricular tachycardia documented    4.  Severe COPD: Continues to follow with Dr. Schroeder.

## 2023-01-04 ENCOUNTER — LAB (OUTPATIENT)
Dept: LAB | Facility: HOSPITAL | Age: 73
End: 2023-01-04
Payer: MEDICARE

## 2023-01-04 ENCOUNTER — TRANSCRIBE ORDERS (OUTPATIENT)
Dept: ADMINISTRATIVE | Facility: HOSPITAL | Age: 73
End: 2023-01-04
Payer: MEDICARE

## 2023-01-04 DIAGNOSIS — N18.2 CHRONIC KIDNEY DISEASE, STAGE II (MILD): Primary | ICD-10-CM

## 2023-01-04 DIAGNOSIS — N18.2 CHRONIC KIDNEY DISEASE, STAGE II (MILD): ICD-10-CM

## 2023-01-04 DIAGNOSIS — I50.22 CHRONIC SYSTOLIC HEART FAILURE: ICD-10-CM

## 2023-01-04 LAB
ALBUMIN SERPL-MCNC: 3.8 G/DL (ref 3.5–5.2)
ALBUMIN UR-MCNC: <1.2 MG/DL
ALBUMIN/GLOB SERPL: 1.1 G/DL
ALP SERPL-CCNC: 74 U/L (ref 39–117)
ALT SERPL W P-5'-P-CCNC: 10 U/L (ref 1–41)
ANION GAP SERPL CALCULATED.3IONS-SCNC: 6 MMOL/L (ref 5–15)
AST SERPL-CCNC: 16 U/L (ref 1–40)
BILIRUB SERPL-MCNC: 0.4 MG/DL (ref 0–1.2)
BILIRUB UR QL STRIP: NEGATIVE
BUN SERPL-MCNC: 25 MG/DL (ref 8–23)
BUN/CREAT SERPL: 19.5 (ref 7–25)
CALCIUM SPEC-SCNC: 9.3 MG/DL (ref 8.6–10.5)
CHLORIDE SERPL-SCNC: 101 MMOL/L (ref 98–107)
CLARITY UR: CLEAR
CO2 SERPL-SCNC: 29 MMOL/L (ref 22–29)
COLOR UR: YELLOW
CREAT SERPL-MCNC: 1.28 MG/DL (ref 0.76–1.27)
CREAT UR-MCNC: 43.8 MG/DL
DEPRECATED RDW RBC AUTO: 43.4 FL (ref 37–54)
EGFRCR SERPLBLD CKD-EPI 2021: 59.5 ML/MIN/1.73
ERYTHROCYTE [DISTWIDTH] IN BLOOD BY AUTOMATED COUNT: 12.5 % (ref 12.3–15.4)
GLOBULIN UR ELPH-MCNC: 3.4 GM/DL
GLUCOSE SERPL-MCNC: 92 MG/DL (ref 65–99)
GLUCOSE UR STRIP-MCNC: NEGATIVE MG/DL
HCT VFR BLD AUTO: 32.6 % (ref 37.5–51)
HGB BLD-MCNC: 11.2 G/DL (ref 13–17.7)
HGB UR QL STRIP.AUTO: NEGATIVE
KETONES UR QL STRIP: NEGATIVE
LEUKOCYTE ESTERASE UR QL STRIP.AUTO: NEGATIVE
MCH RBC QN AUTO: 32.6 PG (ref 26.6–33)
MCHC RBC AUTO-ENTMCNC: 34.4 G/DL (ref 31.5–35.7)
MCV RBC AUTO: 94.8 FL (ref 79–97)
MICROALBUMIN/CREAT UR: NORMAL MG/G{CREAT}
NITRITE UR QL STRIP: NEGATIVE
PH UR STRIP.AUTO: 6 [PH] (ref 5–8)
PLATELET # BLD AUTO: 237 10*3/MM3 (ref 140–450)
PMV BLD AUTO: 9.7 FL (ref 6–12)
POTASSIUM SERPL-SCNC: 4.5 MMOL/L (ref 3.5–5.2)
PROT SERPL-MCNC: 7.2 G/DL (ref 6–8.5)
PROT UR QL STRIP: NEGATIVE
RBC # BLD AUTO: 3.44 10*6/MM3 (ref 4.14–5.8)
SODIUM SERPL-SCNC: 136 MMOL/L (ref 136–145)
SP GR UR STRIP: 1.01 (ref 1–1.03)
UROBILINOGEN UR QL STRIP: NORMAL
WBC NRBC COR # BLD: 6.03 10*3/MM3 (ref 3.4–10.8)

## 2023-01-04 PROCEDURE — 82043 UR ALBUMIN QUANTITATIVE: CPT

## 2023-01-04 PROCEDURE — 36415 COLL VENOUS BLD VENIPUNCTURE: CPT

## 2023-01-04 PROCEDURE — 82570 ASSAY OF URINE CREATININE: CPT

## 2023-01-04 PROCEDURE — 85027 COMPLETE CBC AUTOMATED: CPT

## 2023-01-04 PROCEDURE — 81003 URINALYSIS AUTO W/O SCOPE: CPT

## 2023-01-04 PROCEDURE — 80053 COMPREHEN METABOLIC PANEL: CPT

## 2023-12-06 ENCOUNTER — TELEPHONE (OUTPATIENT)
Dept: CARDIOLOGY | Facility: CLINIC | Age: 73
End: 2023-12-06

## 2023-12-06 NOTE — TELEPHONE ENCOUNTER
"“Please be informed that patient has passed. Patient has been marked  in the system. The date of death is: 23\".    Caller: Kirit Caraballo ([employer])    Relationship: Emergency Contact    Best call back number: [phone]    Did the patient have surgery within 30 days of their passing (Y/N): N  "